# Patient Record
Sex: FEMALE | Race: BLACK OR AFRICAN AMERICAN | Employment: OTHER | URBAN - METROPOLITAN AREA
[De-identification: names, ages, dates, MRNs, and addresses within clinical notes are randomized per-mention and may not be internally consistent; named-entity substitution may affect disease eponyms.]

---

## 2017-02-24 ENCOUNTER — IP HISTORICAL/CONVERTED ENCOUNTER (OUTPATIENT)
Dept: OTHER | Age: 27
End: 2017-02-24

## 2020-10-11 ENCOUNTER — APPOINTMENT (OUTPATIENT)
Dept: GENERAL RADIOLOGY | Age: 30
End: 2020-10-11
Attending: EMERGENCY MEDICINE
Payer: MEDICARE

## 2020-10-11 ENCOUNTER — HOSPITAL ENCOUNTER (EMERGENCY)
Age: 30
Discharge: HOME | End: 2020-10-11
Attending: EMERGENCY MEDICINE
Payer: MEDICARE

## 2020-10-11 VITALS
HEIGHT: 67 IN | TEMPERATURE: 98.4 F | RESPIRATION RATE: 16 BRPM | SYSTOLIC BLOOD PRESSURE: 139 MMHG | BODY MASS INDEX: 25.11 KG/M2 | DIASTOLIC BLOOD PRESSURE: 102 MMHG | WEIGHT: 160 LBS | OXYGEN SATURATION: 100 % | HEART RATE: 87 BPM

## 2020-10-11 DIAGNOSIS — M25.571 RIGHT ANKLE PAIN, UNSPECIFIED CHRONICITY: Primary | ICD-10-CM

## 2020-10-11 PROCEDURE — 73600 X-RAY EXAM OF ANKLE: CPT

## 2020-10-11 PROCEDURE — 99283 EMERGENCY DEPT VISIT LOW MDM: CPT

## 2020-10-11 RX ORDER — IBUPROFEN 800 MG/1
800 TABLET ORAL AS NEEDED
COMMUNITY
Start: 2020-08-22 | End: 2021-07-26

## 2020-10-11 RX ORDER — AMOXICILLIN 500 MG/1
500 CAPSULE ORAL EVERY 8 HOURS
COMMUNITY
Start: 2020-08-22 | End: 2021-06-08 | Stop reason: SDUPTHER

## 2020-10-11 NOTE — LETTER
[address] 
[facility] EMERGENCY DEPT 
[address] 
[phone] Work/School Note Date: 10/11/2020 To Whom It May concern: 
 
Watson Will was seen and treated today in the emergency room by the following provider(s): 
Attending Provider: Lorin De Anda MD. Watson Will is excused from work/school on 10/11/20 and 10/12/20. She is medically clear to return to work/school on 10/13/2020.   
 
 
Sincerely, 
 
 
 
 
Nidia Chow MD

## 2020-10-11 NOTE — ED PROVIDER NOTES
HPI 71-year-old female injured her right ankle in November 2019 x-ray at that time was reportedly negative per her history. injury improved over the course of approximately 6 weeks. Since that time she said lateral ankle pain with extended walking. Job requires standing and walking for most of the day    Past Medical History:   Diagnosis Date    Asthma        History reviewed. No pertinent surgical history. History reviewed. No pertinent family history.     Social History     Socioeconomic History    Marital status: SINGLE     Spouse name: Not on file    Number of children: Not on file    Years of education: Not on file    Highest education level: Not on file   Occupational History    Not on file   Social Needs    Financial resource strain: Not on file    Food insecurity     Worry: Not on file     Inability: Not on file    Transportation needs     Medical: Not on file     Non-medical: Not on file   Tobacco Use    Smoking status: Current Every Day Smoker     Packs/day: 0.25    Smokeless tobacco: Current User   Substance and Sexual Activity    Alcohol use: Yes     Comment: ocassionally    Drug use: Not on file    Sexual activity: Not on file   Lifestyle    Physical activity     Days per week: Not on file     Minutes per session: Not on file    Stress: Not on file   Relationships    Social connections     Talks on phone: Not on file     Gets together: Not on file     Attends Buddhism service: Not on file     Active member of club or organization: Not on file     Attends meetings of clubs or organizations: Not on file     Relationship status: Not on file    Intimate partner violence     Fear of current or ex partner: Not on file     Emotionally abused: Not on file     Physically abused: Not on file     Forced sexual activity: Not on file   Other Topics Concern    Not on file   Social History Narrative    Not on file         ALLERGIES: Peanut    Review of Systems all other systems are negative    Vitals:    10/11/20 1028   BP: (!) 139/102   Pulse: 99   Resp: 16   Temp: 98.4 °F (36.9 °C)   SpO2: 99%   Weight: 72.6 kg (160 lb)   Height: 5' 7\" (1.702 m)            Physical Exam  Vitals signs and nursing note reviewed. Constitutional:       Appearance: Normal appearance. She is normal weight. Musculoskeletal:      Comments: The right ankle shows no gross deformity there is minimal swelling if any; minimal tenderness  Ankle there is no tenderness the fifth metatarsal foot is otherwise normal exam dorsalis pedis posterior tibial pulses are 2+ and capillary refill is normal   Skin:     General: Skin is warm and dry. Neurological:      General: No focal deficit present. Mental Status: She is alert and oriented to person, place, and time. MDM persistent right lateral ankle pain following injury approximately 1 year ago x-ray shows no acute finding suggested ankle support over-the-counter.   And athletic footwear will give Pritesh Luna follow-up       Procedures

## 2020-10-11 NOTE — ED TRIAGE NOTES
Pt states she sprain her right ankle a month ago and is still experiencing ain. Yesterday she rated pain at 10/10 but denies pain today. Pt has been taking ibuprofen to relive pain. Pt did not take any this morning. Pt states the ankle pain has affected her ability to work.

## 2021-06-08 ENCOUNTER — HOSPITAL ENCOUNTER (EMERGENCY)
Age: 31
Discharge: HOME | End: 2021-06-08
Attending: EMERGENCY MEDICINE
Payer: MEDICARE

## 2021-06-08 VITALS
WEIGHT: 180 LBS | SYSTOLIC BLOOD PRESSURE: 155 MMHG | OXYGEN SATURATION: 100 % | BODY MASS INDEX: 28.19 KG/M2 | HEART RATE: 91 BPM | TEMPERATURE: 98.2 F | RESPIRATION RATE: 20 BRPM | DIASTOLIC BLOOD PRESSURE: 102 MMHG

## 2021-06-08 DIAGNOSIS — J01.20 ACUTE ETHMOIDAL SINUSITIS, RECURRENCE NOT SPECIFIED: Primary | ICD-10-CM

## 2021-06-08 PROCEDURE — 74011250637 HC RX REV CODE- 250/637: Performed by: EMERGENCY MEDICINE

## 2021-06-08 PROCEDURE — 99283 EMERGENCY DEPT VISIT LOW MDM: CPT

## 2021-06-08 RX ORDER — AMOXICILLIN 500 MG/1
500 CAPSULE ORAL EVERY 8 HOURS
Qty: 30 CAPSULE | Refills: 0 | Status: SHIPPED | OUTPATIENT
Start: 2021-06-08 | End: 2021-07-26

## 2021-06-08 RX ORDER — CETIRIZINE HYDROCHLORIDE 10 MG/1
10 CAPSULE, LIQUID FILLED ORAL DAILY
Qty: 10 CAPSULE | Refills: 0 | Status: SHIPPED | OUTPATIENT
Start: 2021-06-08 | End: 2021-07-26

## 2021-06-08 RX ORDER — FLUTICASONE PROPIONATE 50 MCG
2 SPRAY, SUSPENSION (ML) NASAL DAILY
Qty: 1 BOTTLE | Refills: 0 | Status: SHIPPED | OUTPATIENT
Start: 2021-06-08 | End: 2021-07-26

## 2021-06-08 RX ORDER — AMOXICILLIN 250 MG/1
500 CAPSULE ORAL
Status: COMPLETED | OUTPATIENT
Start: 2021-06-08 | End: 2021-06-08

## 2021-06-08 RX ORDER — AMOXICILLIN 500 MG/1
500 TABLET, FILM COATED ORAL 3 TIMES DAILY
Qty: 30 TABLET | Refills: 0 | Status: SHIPPED | OUTPATIENT
Start: 2021-06-08 | End: 2021-07-26

## 2021-06-08 RX ADMIN — AMOXICILLIN 500 MG: 250 CAPSULE ORAL at 21:20

## 2021-06-08 NOTE — LETTER
[address] [facility] EMERGENCY DEPT 
[address] 
[phone] Work/School Note Date: 6/8/2021 To Whom It May concern: 
 
 
Andrew Jonas was seen and treated today in the emergency room by the following provider(s): 
Attending Provider: Tracey Guadarrama MD. Andrew Jonas is excused from work/school on 06/08/21. She is clear to return to work/school on 06/09/21. Sincerely, Marlen Beckett RN

## 2021-06-09 NOTE — ED PROVIDER NOTES
Patient presents with complaint of cough, nasal congestion and yellow nasal discharge for past  2 days. Duration:  2 days     Intensity: moderate    Modified by: breathing. Past Medical History:   Diagnosis Date    Asthma        History reviewed. No pertinent surgical history. History reviewed. No pertinent family history. Social History     Socioeconomic History    Marital status: SINGLE     Spouse name: Not on file    Number of children: Not on file    Years of education: Not on file    Highest education level: Not on file   Occupational History    Not on file   Tobacco Use    Smoking status: Current Every Day Smoker     Packs/day: 0.25    Smokeless tobacco: Current User   Vaping Use    Vaping Use: Never used   Substance and Sexual Activity    Alcohol use: Yes     Comment: ocassionally    Drug use: Not Currently    Sexual activity: Yes   Other Topics Concern    Not on file   Social History Narrative    Not on file     Social Determinants of Health     Financial Resource Strain:     Difficulty of Paying Living Expenses:    Food Insecurity:     Worried About Running Out of Food in the Last Year:     [address] in the Last Year:    Transportation Needs:     Lack of Transportation (Medical):  Lack of Transportation (Non-Medical):    Physical Activity:     Days of Exercise per Week:     Minutes of Exercise per Session:    Stress:     Feeling of Stress :    Social Connections:     Frequency of Communication with Friends and Family:     Frequency of Social Gatherings with Friends and Family:     Attends Restorationism Services:     Active Member of Clubs or Organizations:     Attends Club or Organization Meetings:     Marital Status:    Intimate Partner Violence:     Fear of Current or Ex-Partner:     Emotionally Abused:     Physically Abused:     Sexually Abused: ALLERGIES: Peanut    Review of Systems   Constitutional: Negative.     HENT: Positive for congestion, sinus pressure, sinus pain and sore throat. Eyes: Negative. Respiratory: Positive for cough. Cardiovascular: Negative. Gastrointestinal: Negative. Endocrine: Negative. Genitourinary: Negative. Skin: Negative. Allergic/Immunologic: Negative. Neurological: Negative. Hematological: Negative. Psychiatric/Behavioral: Negative. All other systems reviewed and are negative. Vitals:    06/08/21 2037   BP: (!) 155/102   Pulse: 91   Resp: 20   Temp: 98.2 °F (36.8 °C)   SpO2: 100%   Weight: 81.6 kg (180 lb)            Physical Exam  Vitals and nursing note reviewed. Constitutional:       Appearance: She is well-developed. HENT:      Head: Normocephalic and atraumatic. Nose: Congestion and rhinorrhea present. Cardiovascular:      Rate and Rhythm: Normal rate and regular rhythm. Heart sounds: Normal heart sounds. Pulmonary:      Breath sounds: Normal breath sounds. Abdominal:      General: Bowel sounds are normal.      Palpations: Abdomen is soft. Musculoskeletal:         General: Normal range of motion. Cervical back: Normal range of motion and neck supple. Neurological:      General: No focal deficit present. Mental Status: She is alert.    Psychiatric:         Mood and Affect: Mood normal.         Behavior: Behavior normal.          MDM       Procedures

## 2021-07-26 ENCOUNTER — HOSPITAL ENCOUNTER (EMERGENCY)
Age: 31
Discharge: HOME | End: 2021-07-26
Attending: EMERGENCY MEDICINE
Payer: MEDICARE

## 2021-07-26 VITALS
TEMPERATURE: 98.6 F | RESPIRATION RATE: 18 BRPM | OXYGEN SATURATION: 98 % | HEART RATE: 86 BPM | SYSTOLIC BLOOD PRESSURE: 131 MMHG | DIASTOLIC BLOOD PRESSURE: 103 MMHG

## 2021-07-26 DIAGNOSIS — N94.6 MENSTRUAL CRAMPS: ICD-10-CM

## 2021-07-26 DIAGNOSIS — N39.0 URINARY TRACT INFECTION WITHOUT HEMATURIA, SITE UNSPECIFIED: Primary | ICD-10-CM

## 2021-07-26 LAB
APPEARANCE UR: CLEAR
BACTERIA URNS QL MICRO: ABNORMAL /HPF
BILIRUB UR QL: NEGATIVE
COLOR UR: ABNORMAL
GLUCOSE UR STRIP.AUTO-MCNC: NEGATIVE MG/DL
HCG UR QL: NEGATIVE
HGB UR QL STRIP: ABNORMAL
KETONES UR QL STRIP.AUTO: ABNORMAL MG/DL
LEUKOCYTE ESTERASE UR QL STRIP.AUTO: NEGATIVE
NITRITE UR QL STRIP.AUTO: NEGATIVE
PH UR STRIP: 6 [PH] (ref 5–8)
PROT UR STRIP-MCNC: NEGATIVE MG/DL
RBC #/AREA URNS HPF: ABNORMAL /HPF (ref 0–3)
SP GR UR REFRACTOMETRY: 1.02
SP GR UR REFRACTOMETRY: 1.02 (ref 1–1.03)
UROBILINOGEN UR QL STRIP.AUTO: 1 EU/DL (ref 0.2–1)
WBC URNS QL MICRO: ABNORMAL /HPF (ref 0–5)

## 2021-07-26 PROCEDURE — 74011250637 HC RX REV CODE- 250/637: Performed by: EMERGENCY MEDICINE

## 2021-07-26 PROCEDURE — 81025 URINE PREGNANCY TEST: CPT

## 2021-07-26 PROCEDURE — 99283 EMERGENCY DEPT VISIT LOW MDM: CPT

## 2021-07-26 PROCEDURE — 81001 URINALYSIS AUTO W/SCOPE: CPT

## 2021-07-26 RX ORDER — SULFAMETHOXAZOLE AND TRIMETHOPRIM 800; 160 MG/1; MG/1
1 TABLET ORAL 2 TIMES DAILY
Qty: 6 TABLET | Refills: 0 | Status: SHIPPED | OUTPATIENT
Start: 2021-07-26 | End: 2021-07-29

## 2021-07-26 RX ORDER — IBUPROFEN 800 MG/1
800 TABLET ORAL ONCE
Status: COMPLETED | OUTPATIENT
Start: 2021-07-26 | End: 2021-07-26

## 2021-07-26 RX ORDER — PENICILLIN V POTASSIUM 500 MG/1
TABLET, FILM COATED ORAL
COMMUNITY
Start: 2021-05-18 | End: 2022-05-28

## 2021-07-26 RX ADMIN — IBUPROFEN 800 MG: 800 TABLET, FILM COATED ORAL at 13:05

## 2021-07-26 NOTE — ED PROVIDER NOTES
EMERGENCY DEPARTMENT HISTORY AND PHYSICAL EXAM      Date: 7/26/2021  Patient Name: Piedad Villavicencio    History of Presenting Illness     Chief Complaint   Patient presents with    Premenstrual Syndrome       History Provided By: Patient    HPI: Piedad Villavicencio, 28 y.o. female with a past medical history significant asthma and Dental problems presents to the ED with cc of menstrual cramping. Menses began yesterday with cramping that seems more painful than usual.  Patient normally has menstrual cramps with onset of menses has not taken any medication for this. Possibility of pregnancy considered though patient doubts. She is unsure of her last menstrual period though thinks it was about 4 weeks ago. No urinary symptoms fever nausea vomiting diarrhea. No prior abdominal surgery. No other complaints. Denies new sexual contact. There are no other complaints, changes, or physical findings at this time. PCP: Steven Lerma MD    No current facility-administered medications on file prior to encounter. Current Outpatient Medications on File Prior to Encounter   Medication Sig Dispense Refill    penicillin v potassium (VEETID) 500 mg tablet TAKE 1 TABLET BY MOUTH TWICE DAILY      [DISCONTINUED] fluticasone propionate (FLONASE) 50 mcg/actuation nasal spray 2 Sprays by Both Nostrils route daily. 1 Bottle 0    [DISCONTINUED] Cetirizine (ZyrTEC) 10 mg cap Take 10 mg by mouth daily. 10 Capsule 0    [DISCONTINUED] amoxicillin 500 mg tab Take 500 mg by mouth three (3) times daily. 30 Tablet 0    [DISCONTINUED] amoxicillin (AMOXIL) 500 mg capsule Take 1 Capsule by mouth every eight (8) hours. 30 Capsule 0    [DISCONTINUED] ibuprofen (MOTRIN) 800 mg tablet Take 800 mg by mouth as needed. (Patient not taking: Reported on 6/8/2021)         Past History     Past Medical History:  Past Medical History:   Diagnosis Date    Asthma        Past Surgical History:  History reviewed.  No pertinent surgical history. Family History:  History reviewed. No pertinent family history. Social History:  Social History     Tobacco Use    Smoking status: Current Every Day Smoker     Packs/day: 0.25    Smokeless tobacco: Current User   Vaping Use    Vaping Use: Never used   Substance Use Topics    Alcohol use: Yes     Comment: ocassionally    Drug use: Not Currently       Allergies: Allergies   Allergen Reactions    Peanut Hives and Swelling         Review of Systems     Review of all systems negative  Review of Systems    Physical Exam   Young AA female no acute distress  Physical Exam  Vitals and nursing note reviewed. Constitutional:       General: She is not in acute distress. Appearance: Normal appearance. She is not ill-appearing, toxic-appearing or diaphoretic. HENT:      Head: Normocephalic and atraumatic. Nose: Nose normal.      Mouth/Throat:      Mouth: Mucous membranes are moist.   Eyes:      Extraocular Movements: Extraocular movements intact. Conjunctiva/sclera: Conjunctivae normal.   Cardiovascular:      Rate and Rhythm: Normal rate and regular rhythm. Pulses: Normal pulses. Heart sounds: Normal heart sounds. No murmur heard. Pulmonary:      Effort: Pulmonary effort is normal. No respiratory distress. Breath sounds: Normal breath sounds. No wheezing, rhonchi or rales. Chest:      Chest wall: No tenderness. Abdominal:      General: Abdomen is flat. Palpations: There is no mass. Tenderness: There is no abdominal tenderness. There is no right CVA tenderness, left CVA tenderness, guarding or rebound. Hernia: No hernia is present. Musculoskeletal:         General: No tenderness, deformity or signs of injury. Normal range of motion. Cervical back: Normal range of motion and neck supple. No rigidity. No muscular tenderness. Right lower leg: No edema. Left lower leg: No edema. Skin:     General: Skin is warm.       Findings: No erythema or rash. Neurological:      General: No focal deficit present. Mental Status: She is alert and oriented to person, place, and time. Cranial Nerves: No cranial nerve deficit. Sensory: No sensory deficit. Motor: No weakness. Psychiatric:         Mood and Affect: Mood normal.         Behavior: Behavior normal.         Thought Content: Thought content normal.         Lab and Diagnostic Study Results     Labs -   No results found for this or any previous visit (from the past 12 hour(s)). Radiologic Studies -   @lastxrresult@  CT Results  (Last 48 hours)    None        CXR Results  (Last 48 hours)    None            Medical Decision Making   - I am the first provider for this patient. - I reviewed the vital signs, available nursing notes, past medical history, past surgical history, family history and social history. - Initial assessment performed. The patients presenting problems have been discussed, and they are in agreement with the care plan formulated and outlined with them. I have encouraged them to ask questions as they arise throughout their visit. Vital Signs-Reviewed the patient's vital signs. Patient Vitals for the past 12 hrs:   Temp Pulse Resp BP SpO2   07/26/21 1254 -- -- -- -- 98 %   07/26/21 1236 98.6 °F (37 °C) 86 18 (!) 131/103 100 %       Records Reviewed: Nursing Notes and Old Medical Records    The patient presents with pelvic cramping with a differential diagnosis of menstrual cramps; PID; pregnancy; threatened AB ovarian cyst;; appendicitis; renal colic      ED Course: 7874: Labs returned showing UTI went back to discuss this with the patient and she is left the ED for unknown reason-called cell phone and left a message. 1445: Second call to patient teleconference had to leave urgently to  Her Son. Discussed UTI menstrual cramps. No drug allergies. Bactrim prescription sent to [facility].   She plans to follow-up with her regular physician tomorrow. Instructed return for any new or worse symptoms         Provider Notes (Medical Decision Making): MDM       Procedures   Medical Decision Makingedical Decision Making  Performed by: Shahida Barker MD  PROCEDURES:  Procedures       Disposition   Disposition: Condition unchanged and stable  DC- Adult Discharges: All of the diagnostic tests were reviewed and questions answered. Diagnosis, care plan and treatment options were discussed. The patient understands the instructions and will follow up as directed. The patients results have been reviewed with them. They have been counseled regarding their diagnosis. The patient verbally convey understanding and agreement of the signs, symptoms, diagnosis, treatment and prognosis and additionally agrees to follow up as recommended with their PCP in 24 - 48 hours. They also agree with the care-plan and convey that all of their questions have been answered. I have also put together some discharge instructions for them that include: 1) educational information regarding their diagnosis, 2) how to care for their diagnosis at home, as well a 3) list of reasons why they would want to return to the ED prior to their follow-up appointment, should their condition change. DISCHARGE PLAN:  1. Current Discharge Medication List      CONTINUE these medications which have NOT CHANGED    Details   penicillin v potassium (VEETID) 500 mg tablet TAKE 1 TABLET BY MOUTH TWICE DAILY           2. Follow-up Information    None       3. Return to ED if worse   4. Current Discharge Medication List            Diagnosis     Clinical Impression: Urinary tract infection; menstrual cramps  Attestations:    Shahida Barker MD    Please note that this dictation was completed with Retty, the computer voice recognition software. Quite often unanticipated grammatical, syntax, homophones, and other interpretive errors are inadvertently transcribed by the computer software.   Please disregard these errors. Please excuse any errors that have escaped final proofreading. Thank you.

## 2021-07-26 NOTE — ED NOTES
Pt made aware that ultrasounds are ordered via MD discretion. Pt originally placed in room 4 pt requesting different room \"due to wanting to be closer to front and stating \"this is the room for ambulances\".  Pt moved to Exam room 10 by request.

## 2021-07-26 NOTE — ED NOTES
Pt states that she has to leave and go  her kid and can not wait for results of test. Pt asking to have results called to her when MD is available

## 2021-07-26 NOTE — ED TRIAGE NOTES
Pt reports severe pelvic cramping that started yesterday when cycle started. PT reports OB appointment tomorrow. PT requesting ultrasound to [facility] sure nothings going on\".

## 2021-11-01 ENCOUNTER — HOSPITAL ENCOUNTER (EMERGENCY)
Age: 31
Discharge: HOME | End: 2021-11-01
Attending: EMERGENCY MEDICINE
Payer: MEDICARE

## 2021-11-01 VITALS
DIASTOLIC BLOOD PRESSURE: 92 MMHG | HEART RATE: 88 BPM | TEMPERATURE: 98.4 F | WEIGHT: 180 LBS | OXYGEN SATURATION: 100 % | RESPIRATION RATE: 18 BRPM | HEIGHT: 67 IN | SYSTOLIC BLOOD PRESSURE: 143 MMHG | BODY MASS INDEX: 28.25 KG/M2

## 2021-11-01 DIAGNOSIS — T78.40XA ALLERGY, INITIAL ENCOUNTER: Primary | ICD-10-CM

## 2021-11-01 PROCEDURE — 99281 EMR DPT VST MAYX REQ PHY/QHP: CPT

## 2021-11-01 RX ORDER — LORATADINE 10 MG/1
5 TABLET ORAL DAILY
Qty: 10 TABLET | Refills: 0 | Status: SHIPPED | OUTPATIENT
Start: 2021-11-01 | End: 2022-05-28

## 2021-11-01 NOTE — ED PROVIDER NOTES
EMERGENCY DEPARTMENT HISTORY AND PHYSICAL EXAM      Date: 11/1/2021  Patient Name: Juany Gasca    History of Presenting Illness     Chief Complaint   Patient presents with    Sore Throat    Cough       History Provided By: Patient    HPI: Juany Gasca, [address] [place] y.o. female with a past medical history significant asthma presents to the ED with cc of cough runny nose patient denies any sore throat today symptoms present for 5 days patient got relief using Claritin denies any fever chills no nausea vomiting diarrhea    There are no other complaints, changes, or physical findings at this time. PCP: Misbah Rodríguez MD    No current facility-administered medications on file prior to encounter. Current Outpatient Medications on File Prior to Encounter   Medication Sig Dispense Refill    penicillin v potassium (VEETID) 500 mg tablet TAKE 1 TABLET BY MOUTH TWICE DAILY         Past History     Past Medical History:  Past Medical History:   Diagnosis Date    Asthma        Past Surgical History:  No past surgical history on file. Family History:  No family history on file. Social History:  Social History     Tobacco Use    Smoking status: Current Every Day Smoker     Packs/day: 0.25    Smokeless tobacco: Current User   Vaping Use    Vaping Use: Never used   Substance Use Topics    Alcohol use: Yes     Comment: ocassionally    Drug use: Not Currently       Allergies: Allergies   Allergen Reactions    Peanut Hives and Swelling         Review of Systems     Review of Systems   Constitutional: Negative for chills and fever. HENT: Positive for congestion. Negative for rhinorrhea and sore throat. Eyes: Negative for pain and visual disturbance. Respiratory: Positive for cough. Negative for wheezing. Cardiovascular: Negative for chest pain and leg swelling. Gastrointestinal: Negative for abdominal pain and rectal pain. Endocrine: Negative for polydipsia and polyuria. Genitourinary: Negative for dysuria and urgency. Musculoskeletal: Negative for back pain. Neurological: Negative for weakness and headaches. Psychiatric/Behavioral: Negative. Physical Exam     Physical Exam  Vitals and nursing note reviewed. Constitutional:       General: She is not in acute distress. Appearance: She is well-developed and normal weight. She is not ill-appearing, toxic-appearing or diaphoretic. HENT:      Head: Normocephalic and atraumatic. Right Ear: Tympanic membrane normal.      Left Ear: Tympanic membrane normal.      Nose: Congestion present. No rhinorrhea. Mouth/Throat:      Mouth: Mucous membranes are moist.      Pharynx: Oropharynx is clear. Uvula midline. No pharyngeal swelling, oropharyngeal exudate or posterior oropharyngeal erythema. Eyes:      Conjunctiva/sclera: Conjunctivae normal.      Pupils: Pupils are equal, round, and reactive to light. Cardiovascular:      Rate and Rhythm: Normal rate and regular rhythm. Heart sounds: Normal heart sounds. Pulmonary:      Effort: Pulmonary effort is normal.      Breath sounds: Normal breath sounds. Abdominal:      General: Bowel sounds are normal.      Palpations: Abdomen is soft. Tenderness: There is no abdominal tenderness. Skin:     General: Skin is warm and dry. Capillary Refill: Capillary refill takes less than 2 seconds. Neurological:      General: No focal deficit present. Mental Status: She is alert and oriented to person, place, and time. Psychiatric:         Mood and Affect: Mood normal.         Behavior: Behavior normal.         Lab and Diagnostic Study Results     Labs -   No results found for this or any previous visit (from the past 12 hour(s)). Radiologic Studies -   @lastxrresult@  CT Results  (Last 48 hours)    None        CXR Results  (Last 48 hours)    None            Medical Decision Making   - I am the first provider for this patient.     - I reviewed the vital signs, available nursing notes, past medical history, past surgical history, family history and social history. - Initial assessment performed. The patients presenting problems have been discussed, and they are in agreement with the care plan formulated and outlined with them. I have encouraged them to ask questions as they arise throughout their visit. Vital Signs-Reviewed the patient's vital signs. Patient Vitals for the past 12 hrs:   Temp Pulse Resp BP SpO2   11/01/21 1859 98.4 °F (36.9 °C) 88 18 (!) 143/92 100 %       Records Reviewed: Nursing Notes    The patient presents with cough with a differential diagnosis of URI, pneumonia, asthma      ED Course:          Provider Notes (Medical Decision Making): MDM       Procedures   Medical Decision Makingedical Decision Making  Performed by: Krystle Terrazas MD  PROCEDURES:  Procedures       Disposition   Disposition: Condition stable  DC- Adult Discharges: All of the diagnostic tests were reviewed and questions answered. Diagnosis, care plan and treatment options were discussed. The patient understands the instructions and will follow up as directed. The patients results have been reviewed with them. They have been counseled regarding their diagnosis. The patient verbally convey understanding and agreement of the signs, symptoms, diagnosis, treatment and prognosis and additionally agrees to follow up as recommended with their PCP in 24 - 48 hours. They also agree with the care-plan and convey that all of their questions have been answered. I have also put together some discharge instructions for them that include: 1) educational information regarding their diagnosis, 2) how to care for their diagnosis at home, as well a 3) list of reasons why they would want to return to the ED prior to their follow-up appointment, should their condition change. Discharged    DISCHARGE PLAN:  1.    Current Discharge Medication List      START taking these medications    Details   loratadine (Claritin) 10 mg tablet Take 0.5 Tablets by mouth daily. Qty: 10 Tablet, Refills: 0         CONTINUE these medications which have NOT CHANGED    Details   penicillin v potassium (VEETID) 500 mg tablet TAKE 1 TABLET BY MOUTH TWICE DAILY           2. Follow-up Information     Follow up With Specialties Details Why Contact Info    Tim Morgan MD Internal Medicine   [id number]  [phone]          3. Return to ED if worse   4. Current Discharge Medication List      START taking these medications    Details   loratadine (Claritin) 10 mg tablet Take 0.5 Tablets by mouth daily. Qty: 10 Tablet, Refills: 0  Start date: 11/1/2021               Diagnosis     Clinical Impression:   1. Allergy, initial encounter        Attestations:    Deven Babcock MD    Please note that this dictation was completed with Expreem, the computer voice recognition software. Quite often unanticipated grammatical, syntax, homophones, and other interpretive errors are inadvertently transcribed by the computer software. Please disregard these errors. Please excuse any errors that have escaped final proofreading. Thank you.

## 2021-11-01 NOTE — LETTER
[address]  [facility] EMERGENCY DEPT  [address]  [place] [facility] [address]  [phone]    Work/School Note    Date: 11/1/2021    To Whom It May concern:      Roby Raza was seen and treated today in the emergency room by the following provider(s):  Attending Provider: Soy Estrada MD.      Roby Raza is excused from work/school on 11/01/21. She is clear to return to work/school on 11/02/21.         Sincerely,          Anju Mcmullen MD

## 2022-05-28 ENCOUNTER — HOSPITAL ENCOUNTER (EMERGENCY)
Age: 32
Discharge: HOME | End: 2022-05-28
Attending: EMERGENCY MEDICINE
Payer: MEDICARE

## 2022-05-28 VITALS
DIASTOLIC BLOOD PRESSURE: 103 MMHG | TEMPERATURE: 98.1 F | HEIGHT: 67 IN | RESPIRATION RATE: 16 BRPM | WEIGHT: 180 LBS | BODY MASS INDEX: 28.25 KG/M2 | HEART RATE: 82 BPM | SYSTOLIC BLOOD PRESSURE: 155 MMHG | OXYGEN SATURATION: 99 %

## 2022-05-28 DIAGNOSIS — F39 MOOD DISORDER (HCC): Primary | ICD-10-CM

## 2022-05-28 PROCEDURE — 99282 EMERGENCY DEPT VISIT SF MDM: CPT

## 2022-05-28 RX ORDER — SERTRALINE HYDROCHLORIDE 50 MG/1
50 TABLET, FILM COATED ORAL DAILY
COMMUNITY

## 2022-05-28 NOTE — ED TRIAGE NOTES
Patient reports that 3 months ago she broke up with her boyfriend & shortly after began having RLQ abdominal pain with nausea but then the pain just shot down her right leg. Reports that she took a HPT 3 months ago which was negative & then got her period. Patient reports hx of depression & states that she just feels delirious most of the time. Patient is currently taking sertraline 50mg daily. Denies SI&HI.

## 2022-05-28 NOTE — ED PROVIDER NOTES
EMERGENCY DEPARTMENT HISTORY AND PHYSICAL EXAM      Date: 5/28/2022  Patient Name: Emir Christopher    History of Presenting Illness     Chief Complaint   Patient presents with    Headache    Nausea    Depression       History Provided By: Patient    HPI: Emir Christopher, 33 y.o. female with a past medical history significant Bipolar disorder presents to the ED with cc of delirium which she describes as having an out of body sensation, patient states that she stopped taking there sertraline because it put her body in an imbalance which she stated specifically as nausea, patient denies any homicidal suicidal ideations, patient was on sertraline for 1 month which she finished 3 months ago all symptoms mentioned in triage have been ongoing for the past 3 months to which she has not seen her PCP nor contacted local behavioral health resources, patient states she was diagnosed with bipolar disorder 2016 and has been on several medications including Latuda and Seroquel, has history of postpartum depression, patient states there was a refill for the sertraline which she did not obtain    There are no other complaints, changes, or physical findings at this time. PCP: Dottie Pearson MD    No current facility-administered medications on file prior to encounter. Current Outpatient Medications on File Prior to Encounter   Medication Sig Dispense Refill    sertraline (ZOLOFT) 50 mg tablet Take 50 mg by mouth daily.  [DISCONTINUED] loratadine (Claritin) 10 mg tablet Take 0.5 Tablets by mouth daily. 10 Tablet 0    [DISCONTINUED] penicillin v potassium (VEETID) 500 mg tablet TAKE 1 TABLET BY MOUTH TWICE DAILY         Past History     Past Medical History:  Past Medical History:   Diagnosis Date    Asthma        Past Surgical History:  No past surgical history on file. Family History:  No family history on file.     Social History:  Social History     Tobacco Use    Smoking status: Current Every Day Smoker     Packs/day: 0.25    Smokeless tobacco: Current User   Vaping Use    Vaping Use: Never used   Substance Use Topics    Alcohol use: Yes     Comment: ocassionally    Drug use: Not Currently       Allergies: Allergies   Allergen Reactions    Peanut Hives and Swelling         Review of Systems     Review of Systems   Constitutional: Negative for chills and fever. HENT: Negative for rhinorrhea and sore throat. Eyes: Negative for pain and visual disturbance. Respiratory: Negative for cough and shortness of breath. Cardiovascular: Negative for chest pain and leg swelling. Gastrointestinal: Negative for abdominal pain and vomiting. Endocrine: Negative for polydipsia and polyuria. Genitourinary: Negative for dysuria and hematuria. Musculoskeletal: Negative for back pain and neck pain. Skin: Negative for color change and pallor. Neurological: Negative for weakness and headaches. Psychiatric/Behavioral: Positive for sleep disturbance. Negative for agitation, behavioral problems, confusion, decreased concentration, dysphoric mood, hallucinations, self-injury and suicidal ideas. The patient is not nervous/anxious and is not hyperactive. Physical Exam     Physical Exam  Vitals and nursing note reviewed. Constitutional:       General: She is not in acute distress. Appearance: She is not ill-appearing, toxic-appearing or diaphoretic. HENT:      Head: Normocephalic and atraumatic. Right Ear: Tympanic membrane normal.      Left Ear: Tympanic membrane normal.      Nose: Nose normal. No congestion. Mouth/Throat:      Mouth: Mucous membranes are moist.      Pharynx: Oropharynx is clear. Eyes:      Extraocular Movements: Extraocular movements intact. Conjunctiva/sclera: Conjunctivae normal.      Pupils: Pupils are equal, round, and reactive to light. Cardiovascular:      Rate and Rhythm: Normal rate and regular rhythm. Pulses: Normal pulses.       Heart sounds: Normal heart sounds. Pulmonary:      Effort: Pulmonary effort is normal.      Breath sounds: Normal breath sounds. Abdominal:      General: Bowel sounds are normal.      Palpations: Abdomen is soft. Tenderness: There is no abdominal tenderness. Musculoskeletal:         General: No tenderness, deformity or signs of injury. Normal range of motion. Cervical back: Normal range of motion and neck supple. No rigidity or tenderness. Lymphadenopathy:      Cervical: No cervical adenopathy. Skin:     General: Skin is warm and dry. Capillary Refill: Capillary refill takes less than 2 seconds. Findings: No rash. Neurological:      General: No focal deficit present. Mental Status: She is alert and oriented to person, place, and time. Cranial Nerves: No cranial nerve deficit. Sensory: No sensory deficit. Psychiatric:         Attention and Perception: Attention and perception normal. She does not perceive auditory or visual hallucinations. Mood and Affect: Mood normal. Mood is not anxious or depressed. Affect is not blunt, flat, angry, tearful or inappropriate. Speech: Speech normal. Speech is not rapid and pressured, delayed or slurred. Behavior: Behavior normal. Behavior is not agitated, slowed, aggressive, withdrawn or combative. Behavior is cooperative. Thought Content: Thought content normal. Thought content is not paranoid. Thought content does not include homicidal or suicidal ideation. Cognition and Memory: Cognition and memory normal.         Judgment: Judgment normal.         Lab and Diagnostic Study Results     Labs -   No results found for this or any previous visit (from the past 12 hour(s)). Radiologic Studies -   @lastxrresult@  CT Results  (Last 48 hours)    None        CXR Results  (Last 48 hours)    None            Medical Decision Making   - I am the first provider for this patient.     - I reviewed the vital signs, available nursing notes, past medical history, past surgical history, family history and social history. - Initial assessment performed. The patients presenting problems have been discussed, and they are in agreement with the care plan formulated and outlined with them. I have encouraged them to ask questions as they arise throughout their visit. Vital Signs-Reviewed the patient's vital signs. Patient Vitals for the past 12 hrs:   Temp Pulse Resp BP SpO2   05/28/22 1314 98.1 °F (36.7 °C) 82 16 (!) 155/103 99 %       Records Reviewed: Nursing Notes    The patient presents with seeking psychiatric counseling with a differential diagnosis of suicidal, homicidal, major depression      ED Course:          Provider Notes (Medical Decision Making): MDM       Procedures   Medical Decision Makingedical Decision Making  Performed by: Kenrick Quevedo MD  PROCEDURES:  Procedures       Disposition   Disposition: Condition stable and ongoing  DC- Adult Discharges: All of the diagnostic tests were reviewed and questions answered. Diagnosis, care plan and treatment options were discussed. The patient understands the instructions and will follow up as directed. The patients results have been reviewed with them. They have been counseled regarding their diagnosis. The patient verbally convey understanding and agreement of the signs, symptoms, diagnosis, treatment and prognosis and additionally agrees to follow up as recommended with their PCP in 24 - 48 hours. They also agree with the care-plan and convey that all of their questions have been answered. I have also put together some discharge instructions for them that include: 1) educational information regarding their diagnosis, 2) how to care for their diagnosis at home, as well a 3) list of reasons why they would want to return to the ED prior to their follow-up appointment, should their condition change. DISCHARGE PLAN:  1.    Current Discharge Medication List      CONTINUE these medications which have NOT CHANGED    Details   sertraline (ZOLOFT) 50 mg tablet Take 50 mg by mouth daily. 2.   Follow-up Information    None       3. Return to ED if worse   4. Current Discharge Medication List            Diagnosis     Clinical Impression: No diagnosis found. Attestations:    Dejon Tobias MD    Please note that this dictation was completed with [employer], the computer voice recognition software. Quite often unanticipated grammatical, syntax, homophones, and other interpretive errors are inadvertently transcribed by the computer software. Please disregard these errors. Please excuse any errors that have escaped final proofreading. Thank you.

## 2022-05-28 NOTE — DISCHARGE INSTRUCTIONS
Follow-up with your primary care provider as to whether to start up the sertraline after 3 months of not taking it and follow-up with Im[facility] for psychiatric counseling

## 2022-10-01 ENCOUNTER — HOSPITAL ENCOUNTER (EMERGENCY)
Age: 32
Discharge: HOME | End: 2022-10-01
Attending: EMERGENCY MEDICINE
Payer: MEDICARE

## 2022-10-01 VITALS
OXYGEN SATURATION: 100 % | SYSTOLIC BLOOD PRESSURE: 147 MMHG | WEIGHT: 180 LBS | TEMPERATURE: 98.5 F | DIASTOLIC BLOOD PRESSURE: 103 MMHG | RESPIRATION RATE: 16 BRPM | BODY MASS INDEX: 28.93 KG/M2 | HEART RATE: 80 BPM | HEIGHT: 66 IN

## 2022-10-01 DIAGNOSIS — N94.6 MENSTRUAL CRAMPS: Primary | ICD-10-CM

## 2022-10-01 PROCEDURE — 99283 EMERGENCY DEPT VISIT LOW MDM: CPT

## 2022-10-01 NOTE — ED NOTES
In to obtain lab work from patient and retreive urine patient reports that she forgot to urinate in specimen cup while in the bathroom.  Patient also reports that she is not ready to be stuck with a  needle at this time

## 2022-10-01 NOTE — ED PROVIDER NOTES
HPI 69-year-old female with asthma and bipolar disorder presents ED complaining of crampy sudden onset of menstrual bleeding this morning. Question of hematuria. Possibility of pregnancy. LMP was approximately 4 weeks ago and normal.  No fever or abdominal pain. No vaginal discharge denies other symptoms    Past Medical History:   Diagnosis Date    Asthma        No past surgical history on file. History reviewed. No pertinent family history. Social History     Socioeconomic History    Marital status: SINGLE     Spouse name: Not on file    Number of children: Not on file    Years of education: Not on file    Highest education level: Not on file   Occupational History    Not on file   Tobacco Use    Smoking status: Every Day     Packs/day: 0.25     Types: Cigarettes    Smokeless tobacco: Current   Vaping Use    Vaping Use: Never used   Substance and Sexual Activity    Alcohol use: Yes     Comment: ocassionally    Drug use: Not Currently    Sexual activity: Yes   Other Topics Concern    Not on file   Social History Narrative    Not on file     Social Determinants of Health     Financial Resource Strain: Not on file   Food Insecurity: Not on file   Transportation Needs: Not on file   Physical Activity: Not on file   Stress: Not on file   Social Connections: Not on file   Intimate Partner Violence: Not on file   Housing Stability: Not on file         ALLERGIES: Peanut    Review of Systems   Constitutional: Negative. HENT: Negative. Eyes: Negative. Respiratory:  Negative for cough, chest tightness, shortness of breath and wheezing. Cardiovascular:  Negative for chest pain, palpitations and leg swelling. Gastrointestinal:  Negative for abdominal distention, abdominal pain, blood in stool, constipation, diarrhea, nausea and vomiting. Endocrine: Negative. Genitourinary:  Positive for hematuria, menstrual problem and vaginal bleeding.  Negative for difficulty urinating, dysuria, flank pain, frequency, pelvic pain, urgency, vaginal discharge and vaginal pain. Musculoskeletal: Negative. Neurological:  Negative for dizziness, seizures, speech difficulty, weakness and numbness. Hematological: Negative. Psychiatric/Behavioral: Negative. Vitals:    10/01/22 1720   BP: (!) 147/103   Pulse: 80   Resp: 16   Temp: 98.5 °F (36.9 °C)   SpO2: 100%   Weight: 81.6 kg (180 lb)   Height: 5' 6\" (1.676 m)            Physical Exam  Vitals and nursing note reviewed. Constitutional:       General: She is not in acute distress. Appearance: Normal appearance. She is not ill-appearing, toxic-appearing or diaphoretic. HENT:      Head: Normocephalic and atraumatic. Nose: Nose normal.      Mouth/Throat:      Mouth: Mucous membranes are moist.   Eyes:      Extraocular Movements: Extraocular movements intact. Conjunctiva/sclera: Conjunctivae normal.   Cardiovascular:      Rate and Rhythm: Normal rate and regular rhythm. Pulses: Normal pulses. Heart sounds: Normal heart sounds. No murmur heard. Pulmonary:      Effort: Pulmonary effort is normal. No respiratory distress. Breath sounds: Normal breath sounds. No wheezing, rhonchi or rales. Abdominal:      General: Bowel sounds are normal. There is no distension. Palpations: Abdomen is soft. There is no mass. Tenderness: There is no abdominal tenderness. There is no right CVA tenderness, left CVA tenderness, guarding or rebound. Hernia: No hernia is present. Musculoskeletal:         General: No swelling, tenderness, deformity or signs of injury. Normal range of motion. Cervical back: Normal range of motion and neck supple. No rigidity or tenderness. Right lower leg: No edema. Left lower leg: No edema. Lymphadenopathy:      Cervical: No cervical adenopathy. Skin:     General: Skin is warm and dry. Capillary Refill: Capillary refill takes less than 2 seconds.       Findings: No erythema, lesion or rash.   Neurological:      General: No focal deficit present. Mental Status: She is alert and oriented to person, place, and time. Mental status is at baseline. Cranial Nerves: No cranial nerve deficit. Sensory: No sensory deficit. Psychiatric:         Mood and Affect: Mood normal.         Behavior: Behavior normal.         Thought Content: Thought content normal.        MDM  Patient refuses phlebotomy. Nurse instructed patient to obtain urine sample-return from bathroom\" I forgot to repeat in the cup\". 1828: Patient now insistent on discharge and wishes to follow-up with her PCP clinically doubt serious underlying diagnosis anxiety and underlying psychiatric disorder most likely the source of patient's behavior will discharge suspect this is just onset of menstruation with menstrual cramps.   She understands he may return to the ED at anytime  Procedures

## 2022-10-01 NOTE — ED TRIAGE NOTES
Pt brought in by EMS for vaginal bleeding and cramping that started this morning pt reported no clots pt reported it might be urine that the blood is in but she is unsure

## 2023-07-04 ENCOUNTER — HOSPITAL ENCOUNTER (EMERGENCY)
Facility: HOSPITAL | Age: 33
Discharge: HOME | End: 2023-07-05
Attending: EMERGENCY MEDICINE
Payer: MEDICARE

## 2023-07-04 DIAGNOSIS — F39 MOOD DISORDER (HCC): Primary | ICD-10-CM

## 2023-07-04 PROCEDURE — 99282 EMERGENCY DEPT VISIT SF MDM: CPT

## 2023-07-04 ASSESSMENT — LIFESTYLE VARIABLES
HOW OFTEN DO YOU HAVE A DRINK CONTAINING ALCOHOL: NEVER
HOW MANY STANDARD DRINKS CONTAINING ALCOHOL DO YOU HAVE ON A TYPICAL DAY: PATIENT DOES NOT DRINK

## 2023-07-04 ASSESSMENT — PAIN - FUNCTIONAL ASSESSMENT: PAIN_FUNCTIONAL_ASSESSMENT: NONE - DENIES PAIN

## 2023-07-05 VITALS
WEIGHT: 176.2 LBS | BODY MASS INDEX: 30.08 KG/M2 | SYSTOLIC BLOOD PRESSURE: 148 MMHG | HEIGHT: 64 IN | RESPIRATION RATE: 17 BRPM | DIASTOLIC BLOOD PRESSURE: 108 MMHG | HEART RATE: 86 BPM | TEMPERATURE: 98.4 F | OXYGEN SATURATION: 100 %

## 2023-07-05 ASSESSMENT — ENCOUNTER SYMPTOMS
RESPIRATORY NEGATIVE: 1
GASTROINTESTINAL NEGATIVE: 1

## 2023-07-05 NOTE — [facility] NOTE
situation  Previous Hospitalizations: Yes  Unknown if patient has been in restraints   Current Psychiatrist and/or  is none. Lethality Assessment:  The potential for suicide noted by the following: not noted. The potential for homicide is not noted. The patient has not been a perpetrator of sexual or physical abuse. There are not pending charges. The patient is not felt to be at risk for self harm or harm to others. Section III - Psychosocial  The patient's overall mood and attitude is depressed and anxious. Feelings of helplessness and hopelessness are not observed. Generalized anxiety is observed. Panic is not observed. Phobias are not observed. Obsessive compulsive tendencies are not observed. Section IV - Mental Status Exam  The patient's appearance shows no evidence of impairment. The patient's behavior shows no evidence of impairment. The patient is oriented to time, place, person and situation. The patient's speech shows no evidence of impairment. The patient's mood is depressed and is anxious. The range of affect is sad. The patient's thought content demonstrates no evidence of impairment . The thought process shows no evidence of impairment. The patient's perception shows no evidence of impairment. The patient's memory is impaired. The patient's appetite shows no evidence of impairment . The patient's sleep shows no evidence of impairment. The patient's insight shows no evidence of impairment. The patient's judgement is psychologically impaired. Section V - Substance Abuse  The patient is not using substances. The patient has experienced the following withdrawal symptoms: N/A. Section VI - Living Arrangements  The patient Single. The patient lives with son at [facility] [place]. The patient has one child, age 10 . The patient does plan to return home upon discharge. The patient does not have legal issues pending.  The patient's source of income comes from

## 2023-07-05 NOTE — ED TRIAGE NOTES
Pt brought in by police for a mental health problem. Pt states she has had thoughts of harming the girl that lives in the hotel room next to her for the past 2 days. She states she does not know this girl but Alton Cousin gives me bad vibes\". When asked if she wanted to kill this girl or anyone else she said \"no, I don't think I could ever kill anyone\". Pt denies suicidal ideations at this time. Pt reports no thoughts of harming herself within the past 6 months. Pt does have a hx of depression, psychosis, schizophrenia, and bipolar disorder. Pt states she was previously on Abilify but is not currently taking any medications at this time.

## 2023-07-19 ENCOUNTER — HOSPITAL ENCOUNTER (EMERGENCY)
Facility: HOSPITAL | Age: 33
Discharge: HOME | End: 2023-07-19
Attending: EMERGENCY MEDICINE
Payer: MEDICARE

## 2023-07-19 VITALS
BODY MASS INDEX: 29.37 KG/M2 | RESPIRATION RATE: 16 BRPM | WEIGHT: 172 LBS | HEIGHT: 64 IN | SYSTOLIC BLOOD PRESSURE: 132 MMHG | TEMPERATURE: 98 F | DIASTOLIC BLOOD PRESSURE: 93 MMHG | OXYGEN SATURATION: 100 % | HEART RATE: 54 BPM

## 2023-07-19 DIAGNOSIS — G44.209 TENSION HEADACHE: ICD-10-CM

## 2023-07-19 DIAGNOSIS — F41.9 ANXIETY: Primary | ICD-10-CM

## 2023-07-19 PROCEDURE — 6370000000 HC RX 637 (ALT 250 FOR IP): Performed by: EMERGENCY MEDICINE

## 2023-07-19 PROCEDURE — 99283 EMERGENCY DEPT VISIT LOW MDM: CPT

## 2023-07-19 RX ORDER — IBUPROFEN 200 MG
200 TABLET ORAL EVERY 8 HOURS PRN
Qty: 20 TABLET | Refills: 0 | Status: SHIPPED | OUTPATIENT
Start: 2023-07-19 | End: 2023-07-26

## 2023-07-19 RX ORDER — ACETAMINOPHEN 500 MG
1000 TABLET ORAL
Status: COMPLETED | OUTPATIENT
Start: 2023-07-19 | End: 2023-07-19

## 2023-07-19 RX ORDER — HYDROXYZINE PAMOATE 25 MG/1
50 CAPSULE ORAL 2 TIMES DAILY
Qty: 10 CAPSULE | Refills: 0 | Status: SHIPPED | OUTPATIENT
Start: 2023-07-19 | End: 2023-07-24

## 2023-07-19 RX ORDER — IBUPROFEN 800 MG/1
800 TABLET ORAL
Status: COMPLETED | OUTPATIENT
Start: 2023-07-19 | End: 2023-07-19

## 2023-07-19 RX ADMIN — IBUPROFEN 800 MG: 800 TABLET, FILM COATED ORAL at 23:51

## 2023-07-19 RX ADMIN — ACETAMINOPHEN 1000 MG: 500 TABLET ORAL at 23:50

## 2023-07-19 ASSESSMENT — LIFESTYLE VARIABLES
HOW MANY STANDARD DRINKS CONTAINING ALCOHOL DO YOU HAVE ON A TYPICAL DAY: PATIENT DOES NOT DRINK
HOW MANY STANDARD DRINKS CONTAINING ALCOHOL DO YOU HAVE ON A TYPICAL DAY: PATIENT DOES NOT DRINK
HOW OFTEN DO YOU HAVE A DRINK CONTAINING ALCOHOL: NEVER
HOW OFTEN DO YOU HAVE A DRINK CONTAINING ALCOHOL: NEVER

## 2023-07-19 ASSESSMENT — PAIN SCALES - GENERAL: PAINLEVEL_OUTOF10: 0

## 2023-07-19 ASSESSMENT — PAIN - FUNCTIONAL ASSESSMENT: PAIN_FUNCTIONAL_ASSESSMENT: 0-10

## 2023-07-20 NOTE — ED TRIAGE NOTES
Patient brought in voluntarily by [facility]. She states she has been suffering with depression and anxiety. Last took her abilify in June, 2023. States she and her son have been living in a motel, but she lost her job 14 days ago and is unable to pay for it. Patient's son is currently under the care of her sister. Denies SI/HI. Patient requesting help to get back on her medications and is concerned because she is currently homeless. Also complains of headache.

## 2023-07-20 NOTE — ED NOTES
Per Dr. Art Pimentel, patient medically cleared for [facility] eval without labs.       Brian Burt, RN  07/19/23 9536

## 2023-07-20 NOTE — ED NOTES
Patient stable at time of discharge. Reviewed discharge instructions and education. Patient verbalized understanding. Patient states no questions at this time.       Sara Slaughter RN  07/19/23 2903

## 2023-07-20 NOTE — ED PROVIDER NOTES
EMERGENCY DEPARTMENT HISTORY AND PHYSICAL EXAM    Date: 7/19/2023  Patient Name: Newton Baker    History of Presenting Illness     Chief Complaint   Patient presents with    Mental Health Problem    Depression    Anxiety       History Provided By: Patient    HPI: Newton Baker, 29 y.o. female   presents to the ED with cc of anxiety and depression. Patient with history of depression anxiety presents emergency room complaining of anxiety and depression due to her current living situation. Patient denies homicidal or suicidal ideation. Patient relates no history of psychosis. Patient states that she used to take Abilify for her depression but stopped taking it because she did not like the side effect. Patient states she has appointment to see her psychiatrist in 2 weeks. No physical complaint other than mild generalized headache since yesterday. No visual changes, paresthesia, motor dysfunction, dizziness, or motor dysfunction. No head injury. PCP: Maki Rosales MD    No current facility-administered medications on file prior to encounter. No current outpatient medications on file prior to encounter. Past History     Past Medical History:  Past Medical History:   Diagnosis Date    Asthma     Bipolar 1 disorder ([facility])     Depression     Psychosis ([facility])     Schizophrenia ([facility])        Past Surgical History:  History reviewed. No pertinent surgical history. Family History:  History reviewed. No pertinent family history. Social History:  Social History     Tobacco Use    Smoking status: Former     Packs/day: 0.25     Types: Cigarettes     Passive exposure: Never    Smokeless tobacco: Never   Vaping Use    Vaping Use: Never used   Substance Use Topics    Alcohol use: Not Currently    Drug use: Not Currently       Allergies:  No Known Allergies      Review of Systems       Physical Exam   Physical Exam  Vitals and nursing note reviewed.    Constitutional:       General: She is

## 2023-07-20 NOTE — BSMART NOTE
Comprehensive Assessment Form      Section I - Disposition    Primary Diagnosis: Adjustment Disorder with Anxiety  Secondary Diagnosis: Schizophrenia by history    Unsheltered Homelessness  Unemployment  Nonadherence to Medical Treatment    The Medical Doctor to Psychiatrist conference was not completed. The Medical Doctor is in agreement with Psychiatrist disposition because of (reason) the patient does not meet admission criteria. The plan is to discharge with referrals. The on-call Psychiatrist consulted was Dr. Charisma Hurst. The admitting Psychiatrist will be Dr. Charisma Hurst. The admitting Diagnosis is N/A. The Payor source is Medicare Medicaid. This writer reviewed the [address] [place] [address] in nursing flowsheet and the risk level assigned is NO risk. Based on this assessment, the risk of suicide is NO risk and the plan is discharge. Section II - Integrated Summary  Summary:  The patient presented to the ED requesting voluntary admission for depression and anxiety. She denied SI, HI, hallucinations, and current substance use. The patient stated that she has become homeless and sent her son to live with her mother. She has also stopped taking psychiatric medication for psychosis, however she is not displaying or reporting psychotic symptoms today. The patient agreed that her symptoms would resolve if she had employment and stable housing. The patient reported symptoms including depressed mood, increased appetite, difficulty focusing/concentrating, racing thoughts, and 24/7 anxiety with racing thoughts. She reported stressors including unemployment, homelessness, and separation from her son. The patient cited support from her friends. She reported a history of taking psychiatric medication, but has not been taking them currently. Most recent hospitalization was 8 years ago. The patient has no current psychiatrist or therapist, but is interested in both.   The writer offered resources for

## 2023-07-20 NOTE — ED NOTES
Kevin Kaplan with [employer] to bedside to perform virtual interview of patient.       Lawrence Vidales RN  07/19/23 4726

## 2023-07-20 NOTE — [facility]SMART NOTE
[facility] assessment completed, and suicide risk level noted to be NONE. Primary Nurse Andrea Navarro and Physician Dr Conrad Nuñez notified. Concerns not observed. Security/Off- has not been notified.

## 2023-09-08 ENCOUNTER — HOSPITAL ENCOUNTER (EMERGENCY)
Facility: HOSPITAL | Age: 33
Discharge: HOME | End: 2023-09-08
Attending: EMERGENCY MEDICINE
Payer: MEDICARE

## 2023-09-08 VITALS
TEMPERATURE: 98 F | HEIGHT: 64 IN | OXYGEN SATURATION: 96 % | HEART RATE: 89 BPM | SYSTOLIC BLOOD PRESSURE: 151 MMHG | RESPIRATION RATE: 19 BRPM | BODY MASS INDEX: 29.37 KG/M2 | DIASTOLIC BLOOD PRESSURE: 96 MMHG | WEIGHT: 172 LBS

## 2023-09-08 DIAGNOSIS — Z04.6 PSYCHIATRIC EXAM REQUESTED BY AUTHORITY: Primary | ICD-10-CM

## 2023-09-08 LAB
ALBUMIN SERPL-MCNC: 3.8 G/DL (ref 3.5–5)
ALBUMIN/GLOB SERPL: 1.1 (ref 1.1–2.2)
ALP SERPL-CCNC: 49 U/L (ref 45–117)
ALT SERPL-CCNC: 24 U/L (ref 12–78)
AMPHET UR QL SCN: NEGATIVE
ANION GAP SERPL CALC-SCNC: 5 MMOL/L (ref 5–15)
APAP SERPL-MCNC: <2 UG/ML (ref 10–30)
APPEARANCE UR: CLEAR
AST SERPL W P-5'-P-CCNC: 15 U/L (ref 15–37)
BACTERIA URNS QL MICRO: ABNORMAL /HPF
BARBITURATES UR QL SCN: NEGATIVE
BASOPHILS # BLD: 0.1 K/UL (ref 0–0.1)
BASOPHILS NFR BLD: 1 % (ref 0–1)
BENZODIAZ UR QL: NEGATIVE
BILIRUB SERPL-MCNC: 0.3 MG/DL (ref 0.2–1)
BILIRUB UR QL: NEGATIVE
BUN SERPL-MCNC: 12 MG/DL (ref 6–20)
BUN/CREAT SERPL: 11 (ref 12–20)
CA-I BLD-MCNC: 8.9 MG/DL (ref 8.5–10.1)
CANNABINOIDS UR QL SCN: NEGATIVE
CHLORIDE SERPL-SCNC: 108 MMOL/L (ref 97–108)
CO2 SERPL-SCNC: 29 MMOL/L (ref 21–32)
COCAINE UR QL SCN: NEGATIVE
COLOR UR: ABNORMAL
CREAT SERPL-MCNC: 1.08 MG/DL (ref 0.55–1.02)
DATE LAST DOSE: ABNORMAL
DATE LAST DOSE: ABNORMAL
DIFFERENTIAL METHOD BLD: ABNORMAL
DOSE AMOUNT: ABNORMAL UNITS
DOSE AMOUNT: ABNORMAL UNITS
EKG ATRIAL RATE: 78 BPM
EKG DIAGNOSIS: NORMAL
EKG P AXIS: 66 DEGREES
EKG P-R INTERVAL: 137 MS
EKG Q-T INTERVAL: 360 MS
EKG QRS DURATION: 78 MS
EKG QTC CALCULATION (BAZETT): 408 MS
EKG R AXIS: 47 DEGREES
EKG T AXIS: 10 DEGREES
EKG VENTRICULAR RATE: 77 BPM
EOSINOPHIL # BLD: 0 K/UL (ref 0–0.4)
EOSINOPHIL NFR BLD: 0 % (ref 0–7)
ERYTHROCYTE [DISTWIDTH] IN BLOOD BY AUTOMATED COUNT: 15.7 % (ref 11.5–14.5)
ETHANOL SERPL-MCNC: <10 MG/DL (ref 0–0.08)
FLUAV RNA SPEC QL NAA+PROBE: NOT DETECTED
FLUBV RNA SPEC QL NAA+PROBE: NOT DETECTED
GLOBULIN SER CALC-MCNC: 3.6 G/DL (ref 2–4)
GLUCOSE SERPL-MCNC: 102 MG/DL (ref 65–100)
GLUCOSE UR STRIP.AUTO-MCNC: NEGATIVE MG/DL
HCT VFR BLD AUTO: 37.3 % (ref 35–47)
HGB BLD-MCNC: 12 G/DL (ref 11.5–16)
HGB UR QL STRIP: NEGATIVE
IMM GRANULOCYTES # BLD AUTO: 0 K/UL (ref 0–0.04)
IMM GRANULOCYTES NFR BLD AUTO: 0 % (ref 0–0.5)
KETONES UR QL STRIP.AUTO: ABNORMAL MG/DL
LEUKOCYTE ESTERASE UR QL STRIP.AUTO: ABNORMAL
LYMPHOCYTES # BLD: 2.8 K/UL (ref 0.8–3.5)
LYMPHOCYTES NFR BLD: 30 % (ref 12–49)
Lab: NORMAL
MAGNESIUM SERPL-MCNC: 1.8 MG/DL (ref 1.6–2.4)
MCH RBC QN AUTO: 29 PG (ref 26–34)
MCHC RBC AUTO-ENTMCNC: 32.2 G/DL (ref 30–36.5)
MCV RBC AUTO: 90.1 FL (ref 80–99)
MDMA URINE: NEGATIVE
METHADONE UR QL: NEGATIVE
MONOCYTES # BLD: 0.8 K/UL (ref 0–1)
MONOCYTES NFR BLD: 8 % (ref 5–13)
NEUTS SEG # BLD: 5.7 K/UL (ref 1.8–8)
NEUTS SEG NFR BLD: 61 % (ref 32–75)
NITRITE UR QL STRIP.AUTO: NEGATIVE
NRBC # BLD: 0 K/UL (ref 0–0.01)
NRBC BLD-RTO: 0 PER 100 WBC
OPIATES UR QL: NEGATIVE
PCP UR QL: NEGATIVE
PH UR STRIP: 6 (ref 5–8)
PLATELET # BLD AUTO: 272 K/UL (ref 150–400)
PMV BLD AUTO: 10 FL (ref 8.9–12.9)
POTASSIUM SERPL-SCNC: 4 MMOL/L (ref 3.5–5.1)
PROT SERPL-MCNC: 7.4 G/DL (ref 6.4–8.2)
PROT UR STRIP-MCNC: NEGATIVE MG/DL
RBC # BLD AUTO: 4.14 M/UL (ref 3.8–5.2)
RBC #/AREA URNS HPF: ABNORMAL /HPF (ref 0–3)
SALICYLATES SERPL-MCNC: 2.3 MG/DL (ref 2.8–20)
SARS-COV-2 RNA RESP QL NAA+PROBE: NOT DETECTED
SODIUM SERPL-SCNC: 142 MMOL/L (ref 136–145)
SP GR UR REFRACTOMETRY: >1.03 (ref 1–1.03)
UROBILINOGEN UR QL STRIP.AUTO: 1 EU/DL (ref 0.2–1)
WBC # BLD AUTO: 9.4 K/UL (ref 3.6–11)
WBC URNS QL MICRO: ABNORMAL /HPF (ref 0–5)

## 2023-09-08 PROCEDURE — 87636 SARSCOV2 & INF A&B AMP PRB: CPT

## 2023-09-08 PROCEDURE — 80053 COMPREHEN METABOLIC PANEL: CPT

## 2023-09-08 PROCEDURE — 85025 COMPLETE CBC W/AUTO DIFF WBC: CPT

## 2023-09-08 PROCEDURE — 99284 EMERGENCY DEPT VISIT MOD MDM: CPT

## 2023-09-08 PROCEDURE — 80307 DRUG TEST PRSMV CHEM ANLYZR: CPT

## 2023-09-08 PROCEDURE — 83735 ASSAY OF MAGNESIUM: CPT

## 2023-09-08 PROCEDURE — 82077 ASSAY SPEC XCP UR&BREATH IA: CPT

## 2023-09-08 PROCEDURE — 36415 COLL VENOUS BLD VENIPUNCTURE: CPT

## 2023-09-08 PROCEDURE — 81001 URINALYSIS AUTO W/SCOPE: CPT

## 2023-09-08 PROCEDURE — 80179 DRUG ASSAY SALICYLATE: CPT

## 2023-09-08 PROCEDURE — 80143 DRUG ASSAY ACETAMINOPHEN: CPT

## 2023-09-08 NOTE — ED NOTES
Radha Brantley with [employer] called and informed will not be recommending TDO at this time she spoke with Aamir Hernández and patient at this time as well     Ella Angeles RN  09/08/23 4675

## 2023-09-08 NOTE — ED NOTES
Patient sitting quietly on bed lab at bedside to draw blood at this time     Orlando Aviles RN  09/08/23 8414

## 2023-09-08 NOTE — ED PROVIDER NOTES
[facility] EMERGENCY DEPT  EMERGENCY DEPARTMENT HISTORY AND PHYSICAL EXAM      Date: 9/8/2023  Patient Name: Roberto Medellin  MRN: [id number]  [address] 1990  Date of evaluation: 9/8/2023  Provider: Emiliano Jay MD   Note Started: 3:02 PM EDT 9/8/23    HISTORY OF PRESENT ILLNESS     Chief Complaint   Patient presents with    Psychiatric Evaluation       History Provided By: Patient    HPI: Roberto Medellin is a 29 y.o. female tearful, not sure why she is here. States she was at work when police came ot pick her up. States she did have an argument with her mother this morning but denies HI or SI. States mother is attempting to take her daughter away. Pt brought in under an ECO issued at 1430. ECO was taken out by pt mother who states pt is bipolar and has stopped taking her medications. Mother reports pt has threatened to harm her. PAST MEDICAL HISTORY   Past Medical History:  Past Medical History:   Diagnosis Date    Asthma     Bipolar 1 disorder ([facility])     Depression     Psychosis ([facility])     Schizophrenia ([facility])        Past Surgical History:  History reviewed. No pertinent surgical history. Family History:  History reviewed. No pertinent family history. Social History:  Social History     Tobacco Use    Smoking status: Former     Packs/day: 0.25     Types: Cigarettes     Passive exposure: Never    Smokeless tobacco: Never   Vaping Use    Vaping Use: Never used   Substance Use Topics    Alcohol use: Not Currently    Drug use: Not Currently       Allergies:  No Known Allergies    PCP: Leno Mirza MD    Current Meds:   No current facility-administered medications for this encounter.      Current Outpatient Medications   Medication Sig Dispense Refill    ibuprofen (ADVIL) 200 MG tablet Take 1 tablet by mouth every 8 hours as needed for Pain 20 tablet 0       Social Determinants of Health:   Social Determinants of Health     Tobacco Use: Medium Risk    Smoking Tobacco Use: Former Smokeless Tobacco Use: Never    Passive Exposure: Never   Alcohol Use: Not At Risk    Frequency of Alcohol Consumption: Never    Average Number of Drinks: Patient does not drink    Frequency of Binge Drinking: Never   Financial Resource Strain: Not on file   Food Insecurity: Not on file   Transportation Needs: Not on file   Physical Activity: Not on file   Stress: Not on file   Social Connections: Not on file   Intimate Partner Violence: Not on file   Depression: Not on file   Housing Stability: Not on file       PHYSICAL EXAM   Physical Exam  Vitals and nursing note reviewed. Constitutional:       Appearance: Normal appearance. HENT:      Head: Normocephalic and atraumatic. Right Ear: External ear normal.      Left Ear: External ear normal.      Nose: Nose normal.      Mouth/Throat:      Mouth: Mucous membranes are moist.   Cardiovascular:      Rate and Rhythm: Normal rate and regular rhythm. Pulmonary:      Effort: Pulmonary effort is normal.   Abdominal:      Palpations: Abdomen is soft. Musculoskeletal:         General: Normal range of motion. Cervical back: Normal range of motion. Skin:     General: Skin is warm. Capillary Refill: Capillary refill takes less than 2 seconds. Neurological:      General: No focal deficit present. Mental Status: She is alert.    Psychiatric:         Mood and Affect: Mood normal.         Behavior: Behavior normal.          SCREENINGS               LAB, EKG AND DIAGNOSTIC RESULTS   Labs:  Recent Results (from the past 12 hour(s))   CBC with Auto Differential    Collection Time: 09/08/23  3:20 PM   Result Value Ref Range    WBC 9.4 3.6 - 11.0 K/uL    RBC 4.14 3.80 - 5.20 M/uL    Hemoglobin 12.0 11.5 - 16.0 g/dL    Hematocrit 37.3 35.0 - 47.0 %    MCV 90.1 80.0 - 99.0 FL    MCH 29.0 26.0 - 34.0 PG    MCHC 32.2 30.0 - 36.5 g/dL    RDW 15.7 (H) 11.5 - 14.5 %    Platelets 612 490 - 380 K/uL    MPV 10.0 8.9 - 12.9 FL    Nucleated RBCs 0.0 0.0  WBC

## 2023-09-08 NOTE — ED TRIAGE NOTES
Pt brought in under an ECO issued at 1430. ECO was taken out by pt mother who states pt is bipolar and has stopped taking her medications. Mother reports pt has threatened to harm her. Pt uncooperative at triage and will not answer questions. Pt requesting a .

## 2023-09-12 LAB
EKG ATRIAL RATE: 78 BPM
EKG DIAGNOSIS: NORMAL
EKG P AXIS: 66 DEGREES
EKG P-R INTERVAL: 137 MS
EKG Q-T INTERVAL: 360 MS
EKG QRS DURATION: 78 MS
EKG QTC CALCULATION (BAZETT): 408 MS
EKG R AXIS: 47 DEGREES
EKG T AXIS: 10 DEGREES
EKG VENTRICULAR RATE: 77 BPM

## 2023-09-30 ENCOUNTER — HOSPITAL ENCOUNTER (EMERGENCY)
Facility: HOSPITAL | Age: 33
Discharge: HOME | End: 2023-09-30
Attending: EMERGENCY MEDICINE
Payer: MEDICARE

## 2023-09-30 VITALS
OXYGEN SATURATION: 98 % | DIASTOLIC BLOOD PRESSURE: 104 MMHG | SYSTOLIC BLOOD PRESSURE: 156 MMHG | TEMPERATURE: 98.8 F | RESPIRATION RATE: 18 BRPM | HEART RATE: 75 BPM

## 2023-09-30 DIAGNOSIS — B34.9 VIRAL SYNDROME: Primary | ICD-10-CM

## 2023-09-30 PROCEDURE — 99282 EMERGENCY DEPT VISIT SF MDM: CPT

## 2023-09-30 NOTE — DISCHARGE INSTRUCTIONS
You were seen in the ER for your COVID/Flu-like symptoms. Thankfully, your vital signs are all normal, including your oxygen and your heart rate. You should take tylenol or ibuprofen for fever, aches and pains for the next few days. You can alternate these every 3 hours so that you always have something on board. For instance, you can take tylenol at 9am, ibuprofen at noon, tylenol again at 3pm and ibuprofen again at 6pm. Take in plenty of water to stay hydrated and follow up with your primary care doctor in the next few days. Return to the ER for any uncontrollable vomiting or diarrhea, difficulty breathing, or any other new or concerning symptoms.

## 2023-09-30 NOTE — ED PROVIDER NOTES
EMERGENCY DEPARTMENT HISTORY AND PHYSICAL EXAM      Date: (Not on file)  Patient Name: Ziggy Swann      History of Presenting Illness     Chief Complaint   Patient presents with    Flu symptoms       History Provided By: patient    HPI: Ziggy Swann, 29 y.o. female with a past medical history significant for denies presents to the ED with cc of flu-like sx. Onset 2d ago, today is 3rd day of sx. Began with malaise, bodyaches, cough. Yesterday slept all day. Feels a bit SOB from nasal congestion. Denies N/V/D. No known sick contacts. There are no other complaints, changes, or physical findings at this time. PCP: Ada Erickson MD    No current facility-administered medications for this encounter. Current Outpatient Medications   Medication Sig Dispense Refill    ibuprofen (ADVIL) 200 MG tablet Take 1 tablet by mouth every 8 hours as needed for Pain 20 tablet 0       Past History     Past Medical History:  Past Medical History:   Diagnosis Date    Asthma     Bipolar 1 disorder ([facility])     Depression     Psychosis ([facility])     Schizophrenia ([facility])        Past Surgical History:  History reviewed. No pertinent surgical history. Family History:  History reviewed. No pertinent family history. Social History:  Social History     Tobacco Use    Smoking status: Former     Packs/day: .25     Types: Cigarettes     Passive exposure: Never    Smokeless tobacco: Never   Vaping Use    Vaping Use: Never used   Substance Use Topics    Alcohol use: Not Currently    Drug use: Not Currently       Allergies:  No Known Allergies      Review of Systems   Constitutional: Negative except as in HPI. Eyes: Negative except as in HPI.  ENT: Negative except as in HPI. Cardiovascular: Negative except as in HPI. Respiratory: Negative except as in HPI. Gastrointestinal: Negative except as in HPI. Genitourinary: Negative except as in HPI. Musculoskeletal: Negative except as in HPI.   Integumentary: Negative except

## 2023-09-30 NOTE — ED TRIAGE NOTES
PT reports flu like symptoms x 3 days. Pt denies being around anyone flu positive. PT texting during triage and not attentive to writers questions.

## 2023-10-08 ENCOUNTER — HOSPITAL ENCOUNTER (EMERGENCY)
Facility: HOSPITAL | Age: 33
Discharge: HOME | End: 2023-10-08
Attending: EMERGENCY MEDICINE
Payer: MEDICARE

## 2023-10-08 VITALS
RESPIRATION RATE: 18 BRPM | WEIGHT: 175.5 LBS | HEIGHT: 66 IN | TEMPERATURE: 97.7 F | DIASTOLIC BLOOD PRESSURE: 100 MMHG | SYSTOLIC BLOOD PRESSURE: 165 MMHG | BODY MASS INDEX: 28.21 KG/M2 | HEART RATE: 78 BPM | OXYGEN SATURATION: 100 %

## 2023-10-08 DIAGNOSIS — U07.1 COVID: Primary | ICD-10-CM

## 2023-10-08 LAB
FLUAV RNA SPEC QL NAA+PROBE: NOT DETECTED
FLUBV RNA SPEC QL NAA+PROBE: NOT DETECTED
SARS-COV-2 RNA RESP QL NAA+PROBE: DETECTED

## 2023-10-08 PROCEDURE — 99283 EMERGENCY DEPT VISIT LOW MDM: CPT

## 2023-10-08 PROCEDURE — 87636 SARSCOV2 & INF A&B AMP PRB: CPT

## 2023-10-08 ASSESSMENT — ENCOUNTER SYMPTOMS
COUGH: 1
EYES NEGATIVE: 1
ALLERGIC/IMMUNOLOGIC NEGATIVE: 1
GASTROINTESTINAL NEGATIVE: 1

## 2023-10-08 NOTE — ED PROVIDER NOTES
[facility] EMERGENCY DEPT  EMERGENCY DEPARTMENT ENCOUNTER      Pt Name: Charley Vieyra  MRN: [id number]  [address] [facility] 1990  Date of evaluation: 10/8/2023  Provider: Jimena John MD    [address] [facility]       Chief Complaint   Patient presents with    URI         HISTORY OF PRESENT ILLNESS   (Location/Symptom, Timing/Onset, Context/Setting, Quality, Duration, Modifying Factors, Severity)  Note limiting factors. Charley Vieyra is a 29 y.o. female who presents to the emergency department with complaint of cough and runny nose for past 2 weeks. Saw PCP for same. No fever or chills. No shortness of breath . No other associated signs or symptoms. HPI    Nursing Notes were reviewed. REVIEW OF SYSTEMS    (2-9 systems for level 4, 10 or more for level 5)     Review of Systems   Constitutional: Negative. HENT:  Positive for congestion. Eyes: Negative. Respiratory:  Positive for cough. Cardiovascular: Negative. Gastrointestinal: Negative. Endocrine: Negative. Genitourinary: Negative. Musculoskeletal: Negative. Allergic/Immunologic: Negative. Neurological: Negative. Hematological: Negative. Psychiatric/Behavioral: Negative. Except as noted above the remainder of the review of systems was reviewed and negative. PAST MEDICAL HISTORY     Past Medical History:   Diagnosis Date    Asthma     Bipolar 1 disorder ([facility])     Depression     Psychosis ([facility])     Schizophrenia ([facility])          SURGICAL HISTORY     History reviewed. No pertinent surgical history. CURRENT MEDICATIONS       Previous Medications    IBUPROFEN (ADVIL) 200 MG TABLET    Take 1 tablet by mouth every 8 hours as needed for Pain       ALLERGIES     Penicillins and Peanut-containing drug products    FAMILY HISTORY     History reviewed. No pertinent family history.        SOCIAL HISTORY       Social History     Socioeconomic History    Marital status: Single     Spouse name: None    Number of children: None

## 2023-12-29 ENCOUNTER — HOSPITAL ENCOUNTER (EMERGENCY)
Facility: HOSPITAL | Age: 33
Discharge: HOME | End: 2023-12-29

## 2023-12-29 VITALS
HEART RATE: 91 BPM | TEMPERATURE: 98.1 F | DIASTOLIC BLOOD PRESSURE: 104 MMHG | HEIGHT: 63 IN | OXYGEN SATURATION: 100 % | WEIGHT: 175 LBS | RESPIRATION RATE: 19 BRPM | BODY MASS INDEX: 31.01 KG/M2 | SYSTOLIC BLOOD PRESSURE: 132 MMHG

## 2023-12-29 NOTE — ED TRIAGE NOTES
Patient reports bilateral leg and foot pain that is chronic because she walks every where patient cannot tell when same started but always has it

## 2024-03-29 ENCOUNTER — HOSPITAL ENCOUNTER (EMERGENCY)
Facility: HOSPITAL | Age: 34
Discharge: ANOTHER HOSPITAL | End: 2024-03-29
Attending: EMERGENCY MEDICINE
Payer: MEDICARE

## 2024-03-29 VITALS
BODY MASS INDEX: 31 KG/M2 | HEIGHT: 63 IN | RESPIRATION RATE: 19 BRPM | OXYGEN SATURATION: 99 % | SYSTOLIC BLOOD PRESSURE: 132 MMHG | DIASTOLIC BLOOD PRESSURE: 82 MMHG | TEMPERATURE: 98 F | HEART RATE: 71 BPM

## 2024-03-29 DIAGNOSIS — F23 ACUTE PSYCHOSIS (HCC): ICD-10-CM

## 2024-03-29 DIAGNOSIS — R45.851 SUICIDAL IDEATIONS: Primary | ICD-10-CM

## 2024-03-29 LAB
ALBUMIN SERPL-MCNC: 3.9 G/DL (ref 3.5–5)
ALBUMIN/GLOB SERPL: 1 (ref 1.1–2.2)
ALP SERPL-CCNC: 54 U/L (ref 45–117)
ALT SERPL-CCNC: 20 U/L (ref 12–78)
AMPHET UR QL SCN: NEGATIVE
ANION GAP SERPL CALC-SCNC: 10 MMOL/L (ref 5–15)
APAP SERPL-MCNC: <2 UG/ML (ref 10–30)
APPEARANCE UR: CLEAR
AST SERPL W P-5'-P-CCNC: 15 U/L (ref 15–37)
BACTERIA URNS QL MICRO: ABNORMAL /HPF
BARBITURATES UR QL SCN: NEGATIVE
BASOPHILS # BLD: 0.1 K/UL (ref 0–0.1)
BASOPHILS NFR BLD: 0 % (ref 0–1)
BENZODIAZ UR QL: NEGATIVE
BILIRUB SERPL-MCNC: 0.3 MG/DL (ref 0.2–1)
BILIRUB UR QL: NEGATIVE
BUN SERPL-MCNC: 12 MG/DL (ref 6–20)
BUN/CREAT SERPL: 12 (ref 12–20)
CA-I BLD-MCNC: 9.1 MG/DL (ref 8.5–10.1)
CANNABINOIDS UR QL SCN: NEGATIVE
CAOX CRY URNS QL MICRO: ABNORMAL
CHLORIDE SERPL-SCNC: 102 MMOL/L (ref 97–108)
CO2 SERPL-SCNC: 28 MMOL/L (ref 21–32)
COCAINE UR QL SCN: NEGATIVE
COLOR UR: ABNORMAL
CREAT SERPL-MCNC: 1.03 MG/DL (ref 0.55–1.02)
DATE LAST DOSE: ABNORMAL
DATE LAST DOSE: ABNORMAL
DIFFERENTIAL METHOD BLD: ABNORMAL
DOSE AMOUNT: ABNORMAL UNITS
DOSE AMOUNT: ABNORMAL UNITS
EOSINOPHIL # BLD: 0 K/UL (ref 0–0.4)
EOSINOPHIL NFR BLD: 0 % (ref 0–7)
ERYTHROCYTE [DISTWIDTH] IN BLOOD BY AUTOMATED COUNT: 14.9 % (ref 11.5–14.5)
ETHANOL SERPL-MCNC: <10 MG/DL (ref 0–0.08)
FLUAV RNA SPEC QL NAA+PROBE: NOT DETECTED
FLUBV RNA SPEC QL NAA+PROBE: NOT DETECTED
GLOBULIN SER CALC-MCNC: 4 G/DL (ref 2–4)
GLUCOSE SERPL-MCNC: 91 MG/DL (ref 65–100)
GLUCOSE UR STRIP.AUTO-MCNC: NEGATIVE MG/DL
HCG UR QL: NEGATIVE
HCT VFR BLD AUTO: 36.8 % (ref 35–47)
HGB BLD-MCNC: 12 G/DL (ref 11.5–16)
HGB UR QL STRIP: ABNORMAL
IMM GRANULOCYTES # BLD AUTO: 0 K/UL (ref 0–0.04)
IMM GRANULOCYTES NFR BLD AUTO: 0 % (ref 0–0.5)
KETONES UR QL STRIP.AUTO: ABNORMAL MG/DL
LEUKOCYTE ESTERASE UR QL STRIP.AUTO: NEGATIVE
LYMPHOCYTES # BLD: 2 K/UL (ref 0.8–3.5)
LYMPHOCYTES NFR BLD: 17 % (ref 12–49)
Lab: NORMAL
MAGNESIUM SERPL-MCNC: 1.7 MG/DL (ref 1.6–2.4)
MCH RBC QN AUTO: 29 PG (ref 26–34)
MCHC RBC AUTO-ENTMCNC: 32.6 G/DL (ref 30–36.5)
MCV RBC AUTO: 88.9 FL (ref 80–99)
MDMA URINE: NEGATIVE
METHADONE UR QL: NEGATIVE
MONOCYTES # BLD: 0.8 K/UL (ref 0–1)
MONOCYTES NFR BLD: 7 % (ref 5–13)
NEUTS SEG # BLD: 8.9 K/UL (ref 1.8–8)
NEUTS SEG NFR BLD: 76 % (ref 32–75)
NITRITE UR QL STRIP.AUTO: NEGATIVE
NRBC # BLD: 0 K/UL (ref 0–0.01)
NRBC BLD-RTO: 0 PER 100 WBC
OPIATES UR QL: NEGATIVE
PCP UR QL: NEGATIVE
PH UR STRIP: 6 (ref 5–8)
PLATELET # BLD AUTO: 257 K/UL (ref 150–400)
PMV BLD AUTO: 10.3 FL (ref 8.9–12.9)
POTASSIUM SERPL-SCNC: 3.7 MMOL/L (ref 3.5–5.1)
PROT SERPL-MCNC: 7.9 G/DL (ref 6.4–8.2)
PROT UR STRIP-MCNC: NEGATIVE MG/DL
RBC # BLD AUTO: 4.14 M/UL (ref 3.8–5.2)
RBC #/AREA URNS HPF: ABNORMAL /HPF (ref 0–3)
SALICYLATES SERPL-MCNC: <1.7 MG/DL (ref 2.8–20)
SARS-COV-2 RNA RESP QL NAA+PROBE: NOT DETECTED
SODIUM SERPL-SCNC: 140 MMOL/L (ref 136–145)
SP GR UR REFRACTOMETRY: >1.03 (ref 1–1.03)
UROBILINOGEN UR QL STRIP.AUTO: 1 EU/DL (ref 0.2–1)
WBC # BLD AUTO: 11.8 K/UL (ref 3.6–11)
WBC URNS QL MICRO: ABNORMAL /HPF (ref 0–5)

## 2024-03-29 PROCEDURE — 82077 ASSAY SPEC XCP UR&BREATH IA: CPT

## 2024-03-29 PROCEDURE — 99285 EMERGENCY DEPT VISIT HI MDM: CPT

## 2024-03-29 PROCEDURE — 80179 DRUG ASSAY SALICYLATE: CPT

## 2024-03-29 PROCEDURE — 83735 ASSAY OF MAGNESIUM: CPT

## 2024-03-29 PROCEDURE — 87636 SARSCOV2 & INF A&B AMP PRB: CPT

## 2024-03-29 PROCEDURE — 85025 COMPLETE CBC W/AUTO DIFF WBC: CPT

## 2024-03-29 PROCEDURE — 81025 URINE PREGNANCY TEST: CPT

## 2024-03-29 PROCEDURE — 80307 DRUG TEST PRSMV CHEM ANLYZR: CPT

## 2024-03-29 PROCEDURE — 36415 COLL VENOUS BLD VENIPUNCTURE: CPT

## 2024-03-29 PROCEDURE — 81001 URINALYSIS AUTO W/SCOPE: CPT

## 2024-03-29 PROCEDURE — 80053 COMPREHEN METABOLIC PANEL: CPT

## 2024-03-29 PROCEDURE — 80143 DRUG ASSAY ACETAMINOPHEN: CPT

## 2024-03-29 RX ORDER — HYDROXYZINE HYDROCHLORIDE 25 MG/1
25 TABLET, FILM COATED ORAL 3 TIMES DAILY PRN
Status: ON HOLD | COMMUNITY
End: 2024-03-30

## 2024-03-29 RX ORDER — ARIPIPRAZOLE 5 MG/1
5 TABLET ORAL DAILY
Status: ON HOLD | COMMUNITY
End: 2024-04-01 | Stop reason: ALTCHOICE

## 2024-03-29 ASSESSMENT — PAIN - FUNCTIONAL ASSESSMENT: PAIN_FUNCTIONAL_ASSESSMENT: 0-10

## 2024-03-29 ASSESSMENT — PAIN SCALES - GENERAL: PAINLEVEL_OUTOF10: 0

## 2024-03-29 NOTE — ED TRIAGE NOTES
Pt arrives via [place] police for throwing fixates and personal items through windows in her apartment. Pt has forensic cuffs on on arrival, ambulatory to room. Pt hid in closet from police and was cooperative after. Pt denies SI/HI. Pt states she does not want to speak to nursing staff, only police.

## 2024-03-29 NOTE — ED PROVIDER NOTES
[facility] EMERGENCY DEPT  EMERGENCY DEPARTMENT HISTORY AND PHYSICAL EXAM      Date: 3/29/2024  Patient Name: Kaitlin Crespo  MRN: [id number]  YOB: 1990  Date of evaluation: 3/29/2024  Provider: Hayley Weiss MD   Note Started: 4:16 PM EDT 3/29/24    HISTORY OF PRESENT ILLNESS     Chief Complaint   Patient presents with    Mental Health Problem       History Provided By: Patient    HPI: Kaitlin Crespo is a 33 y.o. female     PAST MEDICAL HISTORY   Past Medical History:  Past Medical History:   Diagnosis Date    Asthma     Bipolar 1 disorder ([facility])     Depression     Psychosis ([facility])     Schizophrenia ([facility])        Past Surgical History:  History reviewed. No pertinent surgical history.    Family History:  History reviewed. No pertinent family history.    Social History:  Social History     Tobacco Use    Smoking status: Former     Current packs/day: 0.25     Types: Cigarettes     Passive exposure: Never    Smokeless tobacco: Never   Vaping Use    Vaping Use: Never used   Substance Use Topics    Alcohol use: Not Currently    Drug use: Not Currently       Allergies:  Allergies   Allergen Reactions    Penicillins Hives    Peanut-Containing Drug Products Hives and Swelling       PCP: Benjamin Dimas Sr., MD    Current Meds:   No current facility-administered medications for this encounter.     Current Outpatient Medications   Medication Sig Dispense Refill    hydrOXYzine HCl (ATARAX) 25 MG tablet Take 1 tablet by mouth 3 times daily as needed for Itching      sertraline (ZOLOFT) 50 MG tablet Take 1 tablet by mouth daily      ARIPiprazole (ABILIFY) 5 MG tablet Take 1 tablet by mouth daily      ibuprofen (ADVIL) 200 MG tablet Take 1 tablet by mouth every 8 hours as needed for Pain 20 tablet 0       Social Determinants of Health:   Social Determinants of Health     Tobacco Use: Medium Risk (3/29/2024)    Patient History     Smoking Tobacco Use: Former     Smokeless Tobacco Use: Never     Passive  to person, place, and time.   Psychiatric:         Attention and Perception: Attention and perception normal.         Mood and Affect: Mood normal.         Speech: Speech normal.         Behavior: Behavior normal. Behavior is cooperative.         Thought Content: Thought content is not paranoid or delusional. Thought content does not include homicidal or suicidal ideation. Thought content does not include homicidal or suicidal plan.         Cognition and Memory: Cognition and memory normal.         Judgment: Judgment is impulsive and inappropriate.           SCREENINGS              LAB, EKG AND DIAGNOSTIC RESULTS   Labs:  No results found for this or any previous visit (from the past 12 hour(s)).    EKG: Not Applicable    Radiologic Studies:  Non-plain film images such as CT, Ultrasound and MRI are read by the radiologist. Plain radiographic images are visualized and preliminarily interpreted by the ED Physician with the following findings: Not Applicable.    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

## 2024-03-30 ENCOUNTER — HOSPITAL ENCOUNTER (INPATIENT)
Facility: HOSPITAL | Age: 34
LOS: 9 days | Discharge: HOME | DRG: 885 | End: 2024-04-08
Attending: PSYCHIATRY & NEUROLOGY | Admitting: PSYCHIATRY & NEUROLOGY
Payer: MEDICARE

## 2024-03-30 PROBLEM — F31.9 BIPOLAR DISORDER (HCC): Status: ACTIVE | Noted: 2024-03-30

## 2024-03-30 PROCEDURE — 6370000000 HC RX 637 (ALT 250 FOR IP): Performed by: NURSE PRACTITIONER

## 2024-03-30 PROCEDURE — 1240000000 HC EMOTIONAL WELLNESS R&B

## 2024-03-30 RX ORDER — IBUPROFEN 400 MG/1
400 TABLET ORAL EVERY 6 HOURS PRN
Status: DISCONTINUED | OUTPATIENT
Start: 2024-03-30 | End: 2024-04-04

## 2024-03-30 RX ORDER — HALOPERIDOL 5 MG/1
5 TABLET ORAL EVERY 4 HOURS PRN
Status: DISCONTINUED | OUTPATIENT
Start: 2024-03-30 | End: 2024-04-08 | Stop reason: HOSPADM

## 2024-03-30 RX ORDER — ACETAMINOPHEN 325 MG/1
650 TABLET ORAL EVERY 4 HOURS PRN
Status: DISCONTINUED | OUTPATIENT
Start: 2024-03-30 | End: 2024-04-04

## 2024-03-30 RX ORDER — TRAZODONE HYDROCHLORIDE 50 MG/1
50 TABLET ORAL NIGHTLY PRN
Status: DISCONTINUED | OUTPATIENT
Start: 2024-03-30 | End: 2024-04-08 | Stop reason: HOSPADM

## 2024-03-30 RX ORDER — HYDROXYZINE PAMOATE 25 MG/1
25 CAPSULE ORAL 2 TIMES DAILY PRN
Status: ON HOLD | COMMUNITY
End: 2024-04-08 | Stop reason: HOSPADM

## 2024-03-30 RX ORDER — HYDROXYZINE 50 MG/1
50 TABLET, FILM COATED ORAL 3 TIMES DAILY PRN
Status: DISCONTINUED | OUTPATIENT
Start: 2024-03-30 | End: 2024-04-08 | Stop reason: HOSPADM

## 2024-03-30 RX ORDER — POLYETHYLENE GLYCOL 3350 17 G/17G
17 POWDER, FOR SOLUTION ORAL DAILY PRN
Status: DISCONTINUED | OUTPATIENT
Start: 2024-03-30 | End: 2024-04-08 | Stop reason: HOSPADM

## 2024-03-30 RX ORDER — DIPHENHYDRAMINE HYDROCHLORIDE 50 MG/ML
50 INJECTION INTRAMUSCULAR; INTRAVENOUS EVERY 4 HOURS PRN
Status: DISCONTINUED | OUTPATIENT
Start: 2024-03-30 | End: 2024-04-08 | Stop reason: HOSPADM

## 2024-03-30 RX ORDER — SENNOSIDES A AND B 8.6 MG/1
1 TABLET, FILM COATED ORAL DAILY PRN
Status: DISCONTINUED | OUTPATIENT
Start: 2024-03-30 | End: 2024-04-08 | Stop reason: HOSPADM

## 2024-03-30 RX ORDER — HALOPERIDOL 5 MG/ML
5 INJECTION INTRAMUSCULAR EVERY 4 HOURS PRN
Status: DISCONTINUED | OUTPATIENT
Start: 2024-03-30 | End: 2024-04-08 | Stop reason: HOSPADM

## 2024-03-30 RX ORDER — MAGNESIUM HYDROXIDE/ALUMINUM HYDROXICE/SIMETHICONE 120; 1200; 1200 MG/30ML; MG/30ML; MG/30ML
30 SUSPENSION ORAL EVERY 6 HOURS PRN
Status: DISCONTINUED | OUTPATIENT
Start: 2024-03-30 | End: 2024-04-08 | Stop reason: HOSPADM

## 2024-03-30 RX ADMIN — IBUPROFEN 400 MG: 400 TABLET, FILM COATED ORAL at 10:10

## 2024-03-30 RX ADMIN — ACETAMINOPHEN 650 MG: 325 TABLET ORAL at 20:33

## 2024-03-30 ASSESSMENT — PAIN DESCRIPTION - DESCRIPTORS
DESCRIPTORS: ACHING;CRAMPING
DESCRIPTORS: ACHING;DISCOMFORT;SORE

## 2024-03-30 ASSESSMENT — PAIN DESCRIPTION - LOCATION
LOCATION: ABDOMEN
LOCATION: GENERALIZED

## 2024-03-30 ASSESSMENT — SLEEP AND FATIGUE QUESTIONNAIRES
AVERAGE NUMBER OF SLEEP HOURS: 7
DO YOU USE A SLEEP AID: NO
DO YOU HAVE DIFFICULTY SLEEPING: NO

## 2024-03-30 ASSESSMENT — LIFESTYLE VARIABLES
HOW OFTEN DO YOU HAVE A DRINK CONTAINING ALCOHOL: 2-4 TIMES A MONTH
HOW MANY STANDARD DRINKS CONTAINING ALCOHOL DO YOU HAVE ON A TYPICAL DAY: 3 OR 4

## 2024-03-30 ASSESSMENT — PAIN DESCRIPTION - ORIENTATION: ORIENTATION: LOWER

## 2024-03-30 ASSESSMENT — PAIN - FUNCTIONAL ASSESSMENT
PAIN_FUNCTIONAL_ASSESSMENT: ACTIVITIES ARE NOT PREVENTED
PAIN_FUNCTIONAL_ASSESSMENT: NONE - DENIES PAIN
PAIN_FUNCTIONAL_ASSESSMENT: ACTIVITIES ARE NOT PREVENTED
PAIN_FUNCTIONAL_ASSESSMENT: NONE - DENIES PAIN

## 2024-03-30 ASSESSMENT — PAIN SCALES - GENERAL
PAINLEVEL_OUTOF10: 8
PAINLEVEL_OUTOF10: 6
PAINLEVEL_OUTOF10: 0
PAINLEVEL_OUTOF10: 0

## 2024-03-30 NOTE — PLAN OF CARE
Problem: Anxiety  Goal: Will report anxiety at manageable levels  Description: INTERVENTIONS:  1. Administer medication as ordered  2. Teach and rehearse alternative coping skills  3. Provide emotional support with 1:1 interaction with staff  Outcome: Progressing     0850: Called and left  East a voicemail notifying him pt is a TDO and hearing needs to be set up. Pending call back.       0934: BISHNU Tovar is seeing pt.

## 2024-03-30 NOTE — ED NOTES
D19 has been accepted by [facility] by Alex Stuart MD. Room 728, nurse report number is [phone].

## 2024-03-30 NOTE — PLAN OF CARE
Problem: Safety - Adult  Goal: Free from fall injury  Outcome: Progressing     0152: Hospitalist perfect served about H&P.

## 2024-03-30 NOTE — H&P
History and Physical    Date of Service:  3/30/2024  Primary Care Provider: Benjamin Dimas Sr., MD  Source of information: The patient and Chart review    Chief Complaint: Transferred to [facility] for behavioral/mental health issue    History of Presenting Illness:   Kaitlin rCespo is a 33 y.o. female who initially presented to outside ED for behavioral health problem.  She was escorted by [place] police after she had been throwing her furniture through her window after frustrations at work. She has a hx of depression and bipolar disorder and reportedly takes her Abilify, Zoloft and hydroxyzine as prescribed. An ECO was issued and patient was transferred to [facility] for behavioral health admit. It appears as though she was medically cleared from hospital provider for transfer 3/29/2024.    Hospitalist were consulted for medical H&P.  ED H&P and notes from 3/29/2024 were reviewed.  Patient was seen and examined, she was up interacting with her peers on the acute psychiatric unit-doing another patient's hair.  Patient was cooperative and interactive with assessment, denies any dizziness or headaches, chest pain or pressure, shortness of breath or cough.  Denies any GI complaints such as nausea, vomiting, diarrhea or constipation and has no dysuria (no hesitancy/frequency/burning or pain before/during/after urination).  She reports that she is currently on her menstrual cycle.  She did request medications for menstrual cramping, already has Tylenol ordered-will order ibuprofen.    On abilify, zoloft and hydroxyzine at home.       REVIEW OF SYSTEMS:  As mentioned above in the HPI    Past Medical History:   Diagnosis Date    Asthma     Bipolar 1 disorder ([facility])     Depression     Psychosis ([facility])     Schizophrenia ([facility])       No past surgical history on file.  Prior to Admission medications    Medication Sig Start Date End Date Taking? Authorizing Provider   hydrOXYzine HCl (ATARAX) 25 MG tablet Take 1 tablet by

## 2024-03-30 NOTE — ED NOTES
Pt ambulated out of ED with [place] [employer] to be [facility] to [facility].

## 2024-03-31 LAB
EKG ATRIAL RATE: 62 BPM
EKG DIAGNOSIS: NORMAL
EKG P AXIS: 63 DEGREES
EKG P-R INTERVAL: 156 MS
EKG Q-T INTERVAL: 410 MS
EKG QRS DURATION: 82 MS
EKG QTC CALCULATION (BAZETT): 416 MS
EKG R AXIS: 60 DEGREES
EKG T AXIS: 43 DEGREES
EKG VENTRICULAR RATE: 62 BPM

## 2024-03-31 PROCEDURE — 6370000000 HC RX 637 (ALT 250 FOR IP): Performed by: NURSE PRACTITIONER

## 2024-03-31 PROCEDURE — 93010 ELECTROCARDIOGRAM REPORT: CPT | Performed by: SPECIALIST

## 2024-03-31 PROCEDURE — 93005 ELECTROCARDIOGRAM TRACING: CPT | Performed by: NURSE PRACTITIONER

## 2024-03-31 PROCEDURE — 1240000000 HC EMOTIONAL WELLNESS R&B

## 2024-03-31 RX ORDER — CLONIDINE HYDROCHLORIDE 0.1 MG/1
0.1 TABLET ORAL EVERY 6 HOURS PRN
Status: DISCONTINUED | OUTPATIENT
Start: 2024-03-31 | End: 2024-04-08 | Stop reason: HOSPADM

## 2024-03-31 RX ADMIN — IBUPROFEN 400 MG: 400 TABLET, FILM COATED ORAL at 08:46

## 2024-03-31 RX ADMIN — CLONIDINE HYDROCHLORIDE 0.1 MG: 0.1 TABLET ORAL at 21:08

## 2024-03-31 RX ADMIN — IBUPROFEN 400 MG: 400 TABLET, FILM COATED ORAL at 21:10

## 2024-03-31 RX ADMIN — CLONIDINE HYDROCHLORIDE 0.1 MG: 0.1 TABLET ORAL at 11:23

## 2024-03-31 ASSESSMENT — PAIN SCALES - GENERAL
PAINLEVEL_OUTOF10: 0
PAINLEVEL_OUTOF10: 0
PAINLEVEL_OUTOF10: 4
PAINLEVEL_OUTOF10: 0
PAINLEVEL_OUTOF10: 6

## 2024-03-31 ASSESSMENT — PAIN - FUNCTIONAL ASSESSMENT
PAIN_FUNCTIONAL_ASSESSMENT: ACTIVITIES ARE NOT PREVENTED
PAIN_FUNCTIONAL_ASSESSMENT: NONE - DENIES PAIN

## 2024-03-31 ASSESSMENT — PAIN DESCRIPTION - LOCATION
LOCATION: TEETH
LOCATION: JAW

## 2024-03-31 ASSESSMENT — PAIN DESCRIPTION - DESCRIPTORS
DESCRIPTORS: ACHING
DESCRIPTORS: ACHING

## 2024-03-31 ASSESSMENT — PAIN DESCRIPTION - ORIENTATION
ORIENTATION: OTHER (COMMENT)
ORIENTATION: LEFT;LOWER

## 2024-03-31 ASSESSMENT — PAIN SCALES - WONG BAKER: WONGBAKER_NUMERICALRESPONSE: NO HURT

## 2024-03-31 NOTE — PLAN OF CARE
Problem: Safety - Adult  Goal: Free from fall injury  Outcome: Progressing     Problem: Anxiety  Goal: Will report anxiety at manageable levels  Description: INTERVENTIONS:  1. Administer medication as ordered  2. Teach and rehearse alternative coping skills  3. Provide emotional support with 1:1 interaction with staff  Outcome: Progressing  Note: Pt is alert visible on the unit. Currently calm and cooperative.   0846- pt requests motrin for left lower tooth pain chronic. Education provided. Medication administered. Pt reports improved comfort.    1123- elevated BP. Pt denies history of HTN; c/o anxiety. Remains paranoid about medication. Education provided; Catapres 0.1 mg administered.   Pt states she is fasting today; declines lunch or snack.  Tolerates oral fluid well.     Pt is tearful during the afternoon. Focus is housing and her son. Continues to demonstrated poor insight; states Abilify and Zoloft are PRN medications. No evidence of learning with education provided.

## 2024-04-01 PROCEDURE — 1240000000 HC EMOTIONAL WELLNESS R&B

## 2024-04-01 PROCEDURE — 6370000000 HC RX 637 (ALT 250 FOR IP): Performed by: NURSE PRACTITIONER

## 2024-04-01 RX ORDER — ARIPIPRAZOLE 10 MG/1
5 TABLET ORAL
Status: DISCONTINUED | OUTPATIENT
Start: 2024-04-01 | End: 2024-04-04

## 2024-04-01 RX ADMIN — IBUPROFEN 400 MG: 400 TABLET, FILM COATED ORAL at 18:54

## 2024-04-01 NOTE — CARE COORDINATION
04/01/24 1544   ITP   Date of Plan 04/01/24   Date of Next Review 04/08/24   Primary Diagnosis Code Bipolar d/o   Barriers to Treatment Need for psychoeducation;Psychiatric symptom (comment)   Strengths Incorporated in Plan Acknowledging need for assistance   Plan of Care   Long Term Goal (LTG) Stated in patient/guardian terms Participate in out pt tx 12 wks   Short Term Goal 1   Short Term Goal 1 Client will be oriented to program and staff, and participate in assessment process   Baseline Functioning able to verbalize needs   Target 1 week   Objectives Client will participate in group therapy   Intervention 1 Acknowledge client strengths   Frequency daily   Measured by Staff observation;Self report;Behavioral data   Staff Responsible Clinical staff   Intervention 2 Group therapy   Frequency daily   Measured by Staff observation;Self report   Staff Responsible Clinical staff   Intervention 3 Monitor medications   Frequency daily   Measured by Staff observation;Behavioral data   Staff Responsible Clinical staff   STG Goal 1 Status: Patient Appears to be  Progressing toward treatment plan goal   Crisis/Safety/Discharge Plan   Crisis/Safety Plan Standard program interventions and protocol   Comprehensive Assessment Completion Date 04/01/24   Discharge Plan Pt to discharge home with follow up care

## 2024-04-01 NOTE — PLAN OF CARE
PATIENT RECEIVED RESTING IN BED. NO DISTRESS NOTED. SAFETY MEASURES IN PLACE. WILL CONTINUE TO MONITOR WITH Q15 MINUTE CHECKS.  Problem: Safety - Adult  Goal: Free from fall injury  Outcome: Progressing

## 2024-04-01 NOTE — INTERDISCIPLINARY ROUNDS
Behavioral Health Interdisciplinary Rounds     Patient Name: Kaitlin Crespo  Age: 33 y.o.  Room/Bed:  0/  Primary Diagnosis: Bipolar disorder (HCC)   Admission Status: Voluntary Commitment    Readmission within 30 days: No  Power of  in place: No  Patient requires a blocked bed: No          Reason for blocked bed:  Sleep hours: 5      Participation in Care/Groups:  Yes  Medication Compliant?: Yes  PRNS (last 24 hours):  blood pressure  Restraints (last 24 hours):  No  __________________________________________________  OQ Admission Analysis Survey completed:yes   OQ Admission Analysis Survey score:23   __________________________________________________     Alcohol screening (AUDIT) completed -  yes    Score : 3  Tobacco - patient is a smoker:    Illegal Drugs use:  negative     24 hour chart check complete: Yes    _______________________________________________    Patient goal(s) for today: plan to meet with treatment team   Treatment team focus/goals: Plan to assess for medications and discharge needs.   Progress note: She was voluntarily committed to [facility] this am, she has been loud and irritable when talking about her hearing ,homeless for 14 months - moved in on March 1 - working part time and going to school for CNA -         Financial concerns/prescription coverage:  medicaid   Family contact:                        Family requesting physician contact today:    Discharge plan: She will return home   Access to weapons : no                                                             Outpatient provider(s): [place] [id number]       LOS:  2  Expected LOS:   TBD     Participating treatment team members: Kaitlin Crespo, DEION Azul- Dr. Carolina Shelley RN

## 2024-04-01 NOTE — PLAN OF CARE
Problem: Risk for Elopement  Goal: Patient will not exit the unit/facility without proper excort  Outcome: Progressing     Problem: Anxiety  Goal: Will report anxiety at manageable levels  Description: INTERVENTIONS:  1. Administer medication as ordered  2. Teach and rehearse alternative coping skills  3. Provide emotional support with 1:1 interaction with staff  Outcome: Progressing     Problem: Safety - Adult  Goal: Free from fall injury  Outcome: Adequate for Discharge     Problem: Pain  Goal: Verbalizes/displays adequate comfort level or baseline comfort level  Outcome: Adequate for Discharge

## 2024-04-01 NOTE — PLAN OF CARE
Problem: Safety - Adult  Goal: Free from fall injury  3/31/2024 2355 by Floresita Staples RN  Outcome: Progressing     Problem: Anxiety  Goal: Will report anxiety at manageable levels  Description: INTERVENTIONS:  1. Administer medication as ordered  2. Teach and rehearse alternative coping skills  3. Provide emotional support with 1:1 interaction with staff  Outcome: Progressing     Problem: Pain  Goal: Verbalizes/displays adequate comfort level or baseline comfort level  Outcome: Progressing    Patient is interactive with peers, offering advice and direction.

## 2024-04-02 PROCEDURE — 6370000000 HC RX 637 (ALT 250 FOR IP): Performed by: PSYCHIATRY & NEUROLOGY

## 2024-04-02 PROCEDURE — 6370000000 HC RX 637 (ALT 250 FOR IP): Performed by: NURSE PRACTITIONER

## 2024-04-02 PROCEDURE — 1240000000 HC EMOTIONAL WELLNESS R&B

## 2024-04-02 RX ADMIN — ARIPIPRAZOLE 5 MG: 10 TABLET ORAL at 20:26

## 2024-04-02 RX ADMIN — CLONIDINE HYDROCHLORIDE 0.1 MG: 0.1 TABLET ORAL at 20:40

## 2024-04-02 ASSESSMENT — PAIN - FUNCTIONAL ASSESSMENT: PAIN_FUNCTIONAL_ASSESSMENT: NONE - DENIES PAIN

## 2024-04-02 NOTE — PLAN OF CARE
Problem: Anxiety  Goal: Will report anxiety at manageable levels  Description: INTERVENTIONS:  1. Administer medication as ordered  2. Teach and rehearse alternative coping skills  3. Provide emotional support with 1:1 interaction with staff  Outcome: Progressing     Problem: [place]  Goal: Will exhibit normal sleep and speech and no impulsivity  Description: INTERVENTIONS:  1. Administer medication as ordered  2. Set limits on impulsive behavior  3. Make attempts to decrease external stimuli as possible  Outcome: Progressing     Problem: Involuntary Admit  Goal: Will cooperate with staff recommendations and doctor's orders and will demonstrate appropriate behavior  Description: INTERVENTIONS:  1. Treat underlying conditions and offer medication as ordered  2. Educate regarding involuntary admission procedures and rules  3. Contain excessive/inappropriate behavior per unit and hospital policies  Outcome: Not Progressing     Patient mood is labile, expresses hostility, irritability. Patient is not receptive to recommendations of treatment team. Patient denies si/hi/a/v/h. Reports to having behaviors and being angry, not psychosis.

## 2024-04-02 NOTE — PLAN OF CARE
Problem: Involuntary Admit  Goal: Will cooperate with staff recommendations and doctor's orders and will demonstrate appropriate behavior  Description: INTERVENTIONS:  1. Treat underlying conditions and offer medication as ordered  2. Educate regarding involuntary admission procedures and rules  3. Contain excessive/inappropriate behavior per unit and hospital policies  4/2/2024 1636 by Desirae Sanders, RN  Outcome: Progressing  4/2/2024 1027 by Maddie Shelley, RN  Outcome: Not Progressing

## 2024-04-02 NOTE — PLAN OF CARE
Problem: Safety - Adult  Goal: Free from fall injury  4/1/2024 6296 by Anahy Partida, RN  Outcome: Progressing  4/1/2024 1625 by Erin Guillen, RN  Outcome: Adequate for Discharge   Patient resting in bed with eyes closed. NAD and no complaints noted. Safety measures in place. Will continue to monitor with q15min rounds.

## 2024-04-02 NOTE — PLAN OF CARE
Problem: Involuntary Admit  Goal: Will cooperate with staff recommendations and doctor's orders and will demonstrate appropriate behavior  Description: INTERVENTIONS:  1. Treat underlying conditions and offer medication as ordered  2. Educate regarding involuntary admission procedures and rules  3. Contain excessive/inappropriate behavior per unit and hospital policies  4/2/2024 1027 by Maddie Shelley, RN  Outcome: Not Progressing

## 2024-04-03 PROCEDURE — 6370000000 HC RX 637 (ALT 250 FOR IP): Performed by: NURSE PRACTITIONER

## 2024-04-03 PROCEDURE — 6370000000 HC RX 637 (ALT 250 FOR IP): Performed by: PSYCHIATRY & NEUROLOGY

## 2024-04-03 PROCEDURE — 1240000000 HC EMOTIONAL WELLNESS R&B

## 2024-04-03 RX ADMIN — CLONIDINE HYDROCHLORIDE 0.1 MG: 0.1 TABLET ORAL at 10:02

## 2024-04-03 RX ADMIN — ARIPIPRAZOLE 5 MG: 10 TABLET ORAL at 20:43

## 2024-04-03 ASSESSMENT — PAIN - FUNCTIONAL ASSESSMENT: PAIN_FUNCTIONAL_ASSESSMENT: NONE - DENIES PAIN

## 2024-04-03 NOTE — INTERDISCIPLINARY ROUNDS
Behavioral Health Interdisciplinary Rounds     Patient Name: Kaitlin Crespo  Age: 33 y.o.  Room/Bed:  730/02  Primary Diagnosis: Bipolar disorder (HCC)   Admission Status: Involuntary Commitment    Readmission within 30 days: No  Power of  in place: No  Patient requires a blocked bed: No          Reason for blocked bed:   Sleep hours:  7       Participation in Care/Groups:    Medication Compliant?: Selective  PRNS (last 24 hours):  High Blood Pressure    Restraints (last 24 hours):  No  ___________________________________________  24 hour chart check complete: Yes    _______________________________________________    Patient goal(s) for today: meet with treatment team   Treatment team focus/goals: Plan to titrate her medications   Progress note: She took Abilify last night , but had concerns it was making her nauseated , still resistant to taking medications, reports she has called her  about appealing her commitment      Spiritual Care Consult: no  Financial concerns/prescription coverage:  medicaid   Family contact:  she did not sign a AVNI                         Discharge plan: she will return home   Access to weapons : no                                                              Outpatient provider(s): [facility]       LOS:  4  Expected LOS: TBD     Participating treatment team members: Kaitlin Crespo, SALMA [address] , [facility] RN

## 2024-04-03 NOTE — PLAN OF CARE
Resting quietly in bed, breathing even and in NAD. No concerns noted at this time.    Problem: Pain  Goal: Verbalizes/displays adequate comfort level or baseline comfort level  4/3/2024 0031 by Jammie Meehan RN  Outcome: Progressing     Problem: Morenita  Goal: Will exhibit normal sleep and speech and no impulsivity  Description: INTERVENTIONS:  1. Administer medication as ordered  2. Set limits on impulsive behavior  3. Make attempts to decrease external stimuli as possible  4/3/2024 0031 by Jammie Meehan RN  Outcome: Progressing

## 2024-04-03 NOTE — DISCHARGE INSTRUCTIONS
DISCHARGE SUMMARY    NAME:Kaitlin Crespo  : 1990  MRN: [id number]    The patient Kaitlin Crespo exhibits the ability to control behavior in a less restrictive environment.  Patient's level of functioning is improving.  No assaultive/destructive behavior has been observed for the past 24 hours.  No suicidal/homicidal threat or behavior has been observed for the past 24 hours.  There is no evidence of serious medication side effects.  Patient has not been in physical or protective restraints for at least the past 24 hours.    If weapons involved, how are they secured? No weapons involved     Is patient aware of and in agreement with discharge plan? She is aware of discharge and is in agreement     Arrangements for medication:  Prescriptions     Copy of discharge instructions to provider?:  yes, [place] [id number]     Arrangements for transportation home:  nati    Keep all follow up appointments as scheduled, continue to take prescribed medications per physician instructions.  Mental health crisis number:  911 or your local mental health crisis line number at [phone]      Mental Health Emergency WARM LINE      [phone] ([phone])      M-F: 9am to 9pm      Sat & Sun: 5pm - 9pm  National suicide prevention lines:                             3-287-HCORUMT (1-767-080-0534)       1-237-412-TALK ([phone])    Crisis Text Line:  Text HOME to 502633

## 2024-04-03 NOTE — PLAN OF CARE
Pt is resting quieting this am.  Kaitlin is calm, cooperative, appears to be having some difficulty at time w/ completing a thought, She's been isolative, states she's feels \"ok\".  Med/Meal compliant, denies S/I, H/I, A/H V/H.      Problem: Safety - Adult  Goal: Free from fall injury  Outcome: Progressing     Problem: Anxiety  Goal: Will report anxiety at manageable levels  Description: INTERVENTIONS:  1. Administer medication as ordered  2. Teach and rehearse alternative coping skills  3. Provide emotional support with 1:1 interaction with staff  Outcome: Progressing     Problem: Pain  Goal: Verbalizes/displays adequate comfort level or baseline comfort level  4/3/2024 0031 by Jammie Meehan RN  Outcome: Progressing     Problem: Morenita  Goal: Will exhibit normal sleep and speech and no impulsivity  Description: INTERVENTIONS:  1. Administer medication as ordered  2. Set limits on impulsive behavior  3. Make attempts to decrease external stimuli as possible  4/3/2024 0031 by Jammie Meehan RN  Outcome: Progressing

## 2024-04-03 NOTE — PLAN OF CARE
Problem: Safety - Adult  Goal: Free from fall injury  4/3/2024 1617 by Jonelle Cerna LPN  Outcome: Progressing  Will continue safety checks as per policy   Note no voiced complaints or acute distress at present  Appears guarded at times.

## 2024-04-04 PROCEDURE — 6370000000 HC RX 637 (ALT 250 FOR IP): Performed by: NURSE PRACTITIONER

## 2024-04-04 PROCEDURE — 1240000000 HC EMOTIONAL WELLNESS R&B

## 2024-04-04 PROCEDURE — 6370000000 HC RX 637 (ALT 250 FOR IP): Performed by: PSYCHIATRY & NEUROLOGY

## 2024-04-04 RX ORDER — ACETAMINOPHEN 500 MG
1000 TABLET ORAL EVERY 6 HOURS PRN
Status: DISCONTINUED | OUTPATIENT
Start: 2024-04-04 | End: 2024-04-08 | Stop reason: HOSPADM

## 2024-04-04 RX ORDER — IBUPROFEN 600 MG/1
600 TABLET ORAL EVERY 6 HOURS PRN
Status: DISCONTINUED | OUTPATIENT
Start: 2024-04-04 | End: 2024-04-08 | Stop reason: HOSPADM

## 2024-04-04 RX ADMIN — CARIPRAZINE 1.5 MG: 1.5 CAPSULE, GELATIN COATED ORAL at 14:04

## 2024-04-04 RX ADMIN — ACETAMINOPHEN 1000 MG: 500 TABLET ORAL at 17:51

## 2024-04-04 RX ADMIN — IBUPROFEN 400 MG: 400 TABLET, FILM COATED ORAL at 08:02

## 2024-04-04 ASSESSMENT — PAIN SCALES - WONG BAKER: WONGBAKER_NUMERICALRESPONSE: NO HURT

## 2024-04-04 ASSESSMENT — PAIN SCALES - GENERAL
PAINLEVEL_OUTOF10: 0
PAINLEVEL_OUTOF10: 6
PAINLEVEL_OUTOF10: 0

## 2024-04-04 ASSESSMENT — PAIN DESCRIPTION - ONSET: ONSET: SUDDEN

## 2024-04-04 ASSESSMENT — PAIN DESCRIPTION - LOCATION: LOCATION: TEETH

## 2024-04-04 ASSESSMENT — PAIN DESCRIPTION - DESCRIPTORS: DESCRIPTORS: ACHING

## 2024-04-04 ASSESSMENT — PAIN DESCRIPTION - ORIENTATION: ORIENTATION: LEFT;LOWER

## 2024-04-04 NOTE — INTERDISCIPLINARY ROUNDS
Behavioral Health Interdisciplinary Rounds     Patient Name: Kaitlin Crespo  Age: 33 y.o.  Room/Bed:  730/02  Primary Diagnosis: Bipolar disorder (HCC)   Admission Status: Involuntary Commitment    Readmission within 30 days: No  Power of  in place: No  Patient requires a blocked bed: No          Reason for blocked bed:   Sleep hours: 7      Participation in Care/Groups:  Yes  Medication Compliant?: Yes  PRNS (last 24 hours): Antihypertensive    Restraints (last 24 hours):  No  ___________________________________________  24 hour chart check complete: Yes    _______________________________________________    Patient goal(s) for today: meet with treatment team   Treatment team focus/goals: Plan to switch medications and transfer to general   Progress note: Still not wanting to take medications for her dx , but agreed to take VRAYLAR      Spiritual Care Consult: no  Financial concerns/prescription coverage:  medicaid   Family contact: she did not sign a AVNI                        Family requesting physician contact today:    Discharge plan: she will return to her apartment   Access to weapons : no                                                              Outpatient provider(s): [address] [phone] - [facility]- [email or web address]  Phone: [phone], Fax: [phone]  [place] [address]      LOS:  5  Expected LOS: TBD    Participating treatment team members: Kaitlin Crespo, DEION Azul Dr., RN

## 2024-04-04 NOTE — PLAN OF CARE
Problem: Anxiety  Goal: Will report anxiety at manageable levels  Description: INTERVENTIONS:  1. Administer medication as ordered  2. Teach and rehearse alternative coping skills  3. Provide emotional support with 1:1 interaction with staff  4/4/2024 1633 by Desirae Sanders, RN  Outcome: Progressing  Flowsheets (Taken 4/4/2024 1632)  Will report anxiety at manageable levels: Administer medication as ordered  4/4/2024 0853 by Maddie Shelley, RN  Outcome: Progressing  Flowsheets (Taken 4/4/2024 0736)  Will report anxiety at manageable levels: Administer medication as ordered      O-Z Flap Text: An advancement flap was designed O-Z. Local anesthesia was obtained. The defect edges were debeveled with a #15 scalpel blade. The flap was incised to the underlying adipose tissue. The flap was then undermined and elevated. The tissue surrounding the operative site was undermined extensively with blunt scissor dissection. Hemostasis was obtained using electrocoagulation. The flap was then advanced into the primary defect. Raised tissue cones were removed as necessary by standard technique. The superficial fascia and subcutaneous layers were closed with buried vertical mattress sutures. The epidermis was then approximated. Careful attention was given to eversion and even approximation of the wound edges. A pressure dressing was applied.

## 2024-04-04 NOTE — PLAN OF CARE
Problem: Safety - Adult  Goal: Free from fall injury  4/3/2024 2342 by Danny Stuart, RN  Outcome: Progressing  Flowsheets (Taken 4/3/2024 2342)  Free From Fall Injury: Instruct family/caregiver on patient safety     Problem: Anxiety  Goal: Will report anxiety at manageable levels  Description: INTERVENTIONS:  1. Administer medication as ordered  2. Teach and rehearse alternative coping skills  3. Provide emotional support with 1:1 interaction with staff  Outcome: Progressing

## 2024-04-04 NOTE — PLAN OF CARE
Problem: Anxiety  Goal: Will report anxiety at manageable levels  Description: INTERVENTIONS:  1. Administer medication as ordered  2. Teach and rehearse alternative coping skills  3. Provide emotional support with 1:1 interaction with staff  4/4/2024 0853 by Maddie Shelley RN  Outcome: Progressing  Flowsheets (Taken 4/4/2024 0736)  Will report anxiety at manageable levels: Administer medication as ordered  4/3/2024 2342 by Danny Stuart, RN  Outcome: Progressing  Flowsheets  Taken 4/3/2024 2342 by Danny Stuart RN  Will report anxiety at manageable levels:   Administer medication as ordered   Provide emotional support with 1:1 interaction with staff   Teach and rehearse alternative coping skills  Taken 4/3/2024 1530 by Jonelle Cerna LPN  Will report anxiety at manageable levels: Teach and rehearse alternative coping skills     Problem: Involuntary Admit  Goal: Will cooperate with staff recommendations and doctor's orders and will demonstrate appropriate behavior  Description: INTERVENTIONS:  1. Treat underlying conditions and offer medication as ordered  2. Educate regarding involuntary admission procedures and rules  3. Contain excessive/inappropriate behavior per unit and hospital policies  Outcome: Progressing     Problem: Morenita  Goal: Will exhibit normal sleep and speech and no impulsivity  Description: INTERVENTIONS:  1. Administer medication as ordered  2. Set limits on impulsive behavior  3. Make attempts to decrease external stimuli as possible  Outcome: Progressing     Patient is calm and cooperative Medication compliant. Denies si/hi/a/v/h.  Engages with peers, offering education points on their stay, her stay, the legalities that she perceives. Future focused on discharging, returning to school.

## 2024-04-05 LAB
CHOLEST SERPL-MCNC: 94 MG/DL
EST. AVERAGE GLUCOSE BLD GHB EST-MCNC: 108 MG/DL
HBA1C MFR BLD: 5.4 % (ref 4–5.6)
HDLC SERPL-MCNC: 65 MG/DL
HDLC SERPL: 1.4 (ref 0–5)
LDLC SERPL CALC-MCNC: 26 MG/DL (ref 0–100)
TRIGL SERPL-MCNC: 15 MG/DL
VLDLC SERPL CALC-MCNC: 3 MG/DL

## 2024-04-05 PROCEDURE — 6370000000 HC RX 637 (ALT 250 FOR IP): Performed by: NURSE PRACTITIONER

## 2024-04-05 PROCEDURE — 83036 HEMOGLOBIN GLYCOSYLATED A1C: CPT

## 2024-04-05 PROCEDURE — 6370000000 HC RX 637 (ALT 250 FOR IP): Performed by: PSYCHIATRY & NEUROLOGY

## 2024-04-05 PROCEDURE — 36415 COLL VENOUS BLD VENIPUNCTURE: CPT

## 2024-04-05 PROCEDURE — 1240000000 HC EMOTIONAL WELLNESS R&B

## 2024-04-05 PROCEDURE — 80061 LIPID PANEL: CPT

## 2024-04-05 RX ADMIN — CLONIDINE HYDROCHLORIDE 0.1 MG: 0.1 TABLET ORAL at 08:05

## 2024-04-05 RX ADMIN — CARIPRAZINE 1.5 MG: 1.5 CAPSULE, GELATIN COATED ORAL at 08:05

## 2024-04-05 RX ADMIN — IBUPROFEN 600 MG: 600 TABLET, FILM COATED ORAL at 18:26

## 2024-04-05 ASSESSMENT — PAIN SCALES - GENERAL
PAINLEVEL_OUTOF10: 3
PAINLEVEL_OUTOF10: 3

## 2024-04-05 ASSESSMENT — PAIN DESCRIPTION - DESCRIPTORS: DESCRIPTORS: ACHING

## 2024-04-05 ASSESSMENT — PAIN - FUNCTIONAL ASSESSMENT
PAIN_FUNCTIONAL_ASSESSMENT: NONE - DENIES PAIN
PAIN_FUNCTIONAL_ASSESSMENT: NONE - DENIES PAIN

## 2024-04-05 ASSESSMENT — PAIN DESCRIPTION - LOCATION: LOCATION: TEETH

## 2024-04-05 NOTE — INTERDISCIPLINARY ROUNDS
Behavioral Health Interdisciplinary Rounds     Patient Name: Kaitlin Crespo  Age: 33 y.o.  Room/Bed:  0/  Primary Diagnosis: Bipolar disorder (HCC)  Admission Status: Involuntary Commitment  Readmission within 30 days: No  Power of  in place:  No  Patient requires a blocked bed: No      Reason for blocked bed:  N/A    Sleep hours: 7+  Participation in Care/Groups: Yes  Medication Compliant?: Yes  PRNS (last 24 hours):  Tylenol  Restraints (last 24 hours):   No  __________________________________________________  OQ Admission Analysis Survey completed:  OQ Admission Analysis Survey score:  __________________________________________________                Substance Abuse:   Alcohol screening (AUDIT) completed -     If applicable, date SBIRT discussed in treatment team AND documented:   Tobacco -   24 hour chart check complete: Yes  ______________________________________    Patient goal(s) for today: Communicate needs to staff   Treatment team focus/goals: Assess pt, manage medications, discharge planning   Progress note: Pt is improving. Dr Figueroa has adjusted pt medications. Will monitor over weeknd        Financial concerns/prescription coverage:    Family contact:                        Family requesting physician contact today:    Discharge plan:   Access to weapons :                                                              Outpatient provider(s):   Patient's preferred phone number for follow up call :   Patient's preferred e-mail address :  Participating treatment team members:    LOS: 6  Expected LOS: 4/8/24    Participating treatment team members: Kaitlin Epifanioelder Crespo, FERNANDO Torres, Maricel CASTANEDA RN, Dr. Figueroa

## 2024-04-05 NOTE — PLAN OF CARE
Problem: Anxiety  Goal: Will report anxiety at manageable levels  Description: INTERVENTIONS:  1. Administer medication as ordered  2. Teach and rehearse alternative coping skills  3. Provide emotional support with 1:1 interaction with staff  Outcome: Progressing

## 2024-04-05 NOTE — PLAN OF CARE
Problem: ABCDS Injury Assessment  Goal: Absence of physical injury  Outcome: Progressing  Flowsheets (Taken 4/5/2024 0039)  Absence of Physical Injury: Implement safety measures based on patient assessment  Note: Pt received resting quietly in bed with eyes closed. No signs of respiratory distress. Safety measures in place. Will continue to monitor q15m rounds.

## 2024-04-06 ENCOUNTER — APPOINTMENT (OUTPATIENT)
Facility: HOSPITAL | Age: 34
DRG: 885 | End: 2024-04-06
Attending: PSYCHIATRY & NEUROLOGY
Payer: MEDICARE

## 2024-04-06 PROCEDURE — 6370000000 HC RX 637 (ALT 250 FOR IP): Performed by: PSYCHIATRY & NEUROLOGY

## 2024-04-06 PROCEDURE — 74022 RADEX COMPL AQT ABD SERIES: CPT

## 2024-04-06 PROCEDURE — 76700 US EXAM ABDOM COMPLETE: CPT

## 2024-04-06 PROCEDURE — 1240000000 HC EMOTIONAL WELLNESS R&B

## 2024-04-06 RX ADMIN — ACETAMINOPHEN 1000 MG: 500 TABLET ORAL at 19:25

## 2024-04-06 RX ADMIN — CARIPRAZINE 3 MG: 1.5 CAPSULE, GELATIN COATED ORAL at 09:15

## 2024-04-06 ASSESSMENT — PAIN DESCRIPTION - DESCRIPTORS: DESCRIPTORS: DISCOMFORT

## 2024-04-06 ASSESSMENT — PAIN DESCRIPTION - LOCATION: LOCATION: GENERALIZED

## 2024-04-06 ASSESSMENT — PAIN - FUNCTIONAL ASSESSMENT: PAIN_FUNCTIONAL_ASSESSMENT: ACTIVITIES ARE NOT PREVENTED

## 2024-04-06 ASSESSMENT — PAIN SCALES - GENERAL: PAINLEVEL_OUTOF10: 10

## 2024-04-06 NOTE — PLAN OF CARE
1409- Dr Figueroa placed hospitalist consult for  AAF w/bp d/o experiencing RLQ sharp pain 8/10, is having BMs, no change in appetite.   Perfect serve sent; wait for reply.   New orders placed by hospitalist. Pt is NPO; eduction provided. Pt verbalizes understanding of NPO. MD will see pt after imaging has resulted.   Pt leave unit for US and Xray.   1925- pt requests tylenol for 10/10 generalized discomfort; medication administered. Pt is visible on the unit talking to staff and peers.     Problem: Safety - Adult  Goal: Free from fall injury  Outcome: Progressing     Problem: Anxiety  Goal: Will report anxiety at manageable levels  Description: INTERVENTIONS:  1. Administer medication as ordered  2. Teach and rehearse alternative coping skills  3. Provide emotional support with 1:1 interaction with staff  Outcome: Progressing     Problem: Morenita  Goal: Will exhibit normal sleep and speech and no impulsivity  Description: INTERVENTIONS:  1. Administer medication as ordered  2. Set limits on impulsive behavior  3. Make attempts to decrease external stimuli as possible  Outcome: Progressing  Note: Pt is alert and oriented. Calm and cooperative. Bright affect when engaged. Mood and affect congruent. Medication compliant. Reports sleeping well and feeling well rested.

## 2024-04-06 NOTE — CONSULTS
\"TP\", \"ALB\", \"GLOB\", \"GGT\", \"AML\" in the last 72 hours.    Invalid input(s): \"SGOT\", \"GPT\", \"AP\", \"TBIL\", \"TBILI\", \"AMYP\", \"LPSE\", \"HLPSE\"  No results for input(s): \"INR\", \"APTT\" in the last 72 hours.    Invalid input(s): \"PTP\"   No results for input(s): \"TIBC\", \"FERR\" in the last 72 hours.    Invalid input(s): \"FE\", \"PSAT\"   No results found for: \"FOL\", \"RBCF\"   No results for input(s): \"PH\", \"PCO2\", \"PO2\" in the last 72 hours.  No results for input(s): \"CPK\" in the last 72 hours.    Invalid input(s): \"CPKMB\", \"CKNDX\", \"TROIQ\"  Lab Results   Component Value Date/Time    CHOL 94 04/05/2024 06:17 AM    HDL 65 04/05/2024 06:17 AM     No results found for: \"GLUCPOC\"  [unfilled]    Notes reviewed from all clinical/nonclinical/nursing services involved in patient's clinical care. Care coordination discussions were held with appropriate clinical/nonclinical/ nursing providers based on care coordination needs.         Patients current active Medications were reviewed, considered, added and adjusted based on the clinical condition today.      Home Medications were reconciled to the best of my ability given all available resources at the time of admission. Route is PO if not otherwise noted.      Admission Status:[id number]:::1}      Medications Reviewed:     Current Facility-Administered Medications   Medication Dose Route Frequency    cariprazine hcl (VRAYLAR) capsule 3 mg  3 mg Oral Daily    acetaminophen (TYLENOL) tablet 1,000 mg  1,000 mg Oral Q6H PRN    ibuprofen (ADVIL;MOTRIN) tablet 600 mg  600 mg Oral Q6H PRN    benzocaine (ORAJEL) 20 % mucosal gel   Mouth/Throat BID PRN    cloNIDine (CATAPRES) tablet 0.1 mg  0.1 mg Oral Q6H PRN    polyethylene glycol (GLYCOLAX) packet 17 g  17 g Oral Daily PRN    senna (SENOKOT) tablet 8.6 mg  1 tablet Oral Daily PRN    aluminum & magnesium hydroxide-simethicone (MAALOX) 200-200-20 MG/5ML suspension 30 mL  30 mL Oral Q6H PRN    hydrOXYzine HCl (ATARAX) tablet 50 mg  50 mg Oral TID PRN

## 2024-04-06 NOTE — PLAN OF CARE
Kaitlin is resting quietly in bed, resting quietly and in NAD. Safety measures maintained, no concerns noted at this time.     Problem: Pain  Goal: Verbalizes/displays adequate comfort level or baseline comfort level  Outcome: Progressing     Problem: Psychosis  Goal: Will report no hallucinations or delusions  Description: INTERVENTIONS:  1. Administer medication as  ordered  2. Assist with reality testing to support increasing orientation  3. Assess if patient's hallucinations or delusions are encouraging self harm or harm to others and intervene as appropriate  Outcome: Progressing

## 2024-04-07 ENCOUNTER — APPOINTMENT (OUTPATIENT)
Facility: HOSPITAL | Age: 34
DRG: 885 | End: 2024-04-07
Attending: PSYCHIATRY & NEUROLOGY
Payer: MEDICARE

## 2024-04-07 LAB
APPEARANCE UR: CLEAR
BACTERIA URNS QL MICRO: NEGATIVE /HPF
BILIRUB UR QL: NEGATIVE
COLOR UR: NORMAL
EPITH CASTS URNS QL MICRO: NORMAL /LPF
GLUCOSE UR STRIP.AUTO-MCNC: NEGATIVE MG/DL
HGB UR QL STRIP: NEGATIVE
HYALINE CASTS URNS QL MICRO: NORMAL /LPF (ref 0–5)
KETONES UR QL STRIP.AUTO: NEGATIVE MG/DL
LEUKOCYTE ESTERASE UR QL STRIP.AUTO: NEGATIVE
NITRITE UR QL STRIP.AUTO: NEGATIVE
PH UR STRIP: 6 (ref 5–8)
PROT UR STRIP-MCNC: NEGATIVE MG/DL
RBC #/AREA URNS HPF: NORMAL /HPF (ref 0–5)
SP GR UR REFRACTOMETRY: 1.01 (ref 1–1.03)
URINE CULTURE IF INDICATED: NORMAL
UROBILINOGEN UR QL STRIP.AUTO: 0.2 EU/DL (ref 0.2–1)
WBC URNS QL MICRO: NORMAL /HPF (ref 0–4)

## 2024-04-07 PROCEDURE — 81001 URINALYSIS AUTO W/SCOPE: CPT

## 2024-04-07 PROCEDURE — 6370000000 HC RX 637 (ALT 250 FOR IP): Performed by: PSYCHIATRY & NEUROLOGY

## 2024-04-07 PROCEDURE — 73030 X-RAY EXAM OF SHOULDER: CPT

## 2024-04-07 PROCEDURE — 1240000000 HC EMOTIONAL WELLNESS R&B

## 2024-04-07 RX ADMIN — CARIPRAZINE 3 MG: 1.5 CAPSULE, GELATIN COATED ORAL at 08:43

## 2024-04-07 RX ADMIN — ACETAMINOPHEN 1000 MG: 500 TABLET ORAL at 18:40

## 2024-04-07 ASSESSMENT — PAIN SCALES - GENERAL
PAINLEVEL_OUTOF10: 7
PAINLEVEL_OUTOF10: 0

## 2024-04-07 ASSESSMENT — PAIN DESCRIPTION - LOCATION: LOCATION: TEETH

## 2024-04-07 ASSESSMENT — PAIN - FUNCTIONAL ASSESSMENT
PAIN_FUNCTIONAL_ASSESSMENT: ACTIVITIES ARE NOT PREVENTED
PAIN_FUNCTIONAL_ASSESSMENT: 0-10

## 2024-04-07 ASSESSMENT — PAIN DESCRIPTION - ORIENTATION: ORIENTATION: LEFT;LOWER

## 2024-04-07 ASSESSMENT — PAIN DESCRIPTION - DESCRIPTORS: DESCRIPTORS: ACHING

## 2024-04-07 NOTE — PLAN OF CARE
Problem: Safety - Adult  Goal: Free from fall injury  Outcome: Progressing     Problem: Anxiety  Goal: Will report anxiety at manageable levels  Description: INTERVENTIONS:  1. Administer medication as ordered  2. Teach and rehearse alternative coping skills  3. Provide emotional support with 1:1 interaction with staff  Outcome: Progressing     Problem: Pain  Goal: Verbalizes/displays adequate comfort level or baseline comfort level  Outcome: Progressing  Note: Pt is alert and oriented. Irritable. Reports poor sleep overnight due to disruptive milieu. Medication compliant with education. Pt refuses NPO and lab work. Pt tolerates leaving the unit with staff and transport for xray.   Lizabeth NP on the unit to see pt. Pt continues to refuse lab work. Denies pain. Pt is focused on discharging tomorrow.

## 2024-04-07 NOTE — BH NOTE
Behavioral Health Interdisciplinary Rounds     Patient Name: Kaitlin Crespo  Age: 33 y.o.  Room/Bed:  730/02  Primary Diagnosis: Bipolar disorder (HCC)   Admission Status: Voluntary Commitment    Readmission within 30 days: No  Power of  in place: No  Patient requires a blocked bed: No          Reason for blocked bed:   Sleep hours:        Participation in Care/Groups:  No  Medication Compliant?: Refusing Psychiatric Medications  PRNS (last 24 hours): Pain   Restraints (last 24 hours):  No  ___________________________________________  24 hour chart check complete: Yes    _______________________________________________    Patient goal(s) for today: meet with treatment team   Treatment team focus/goals: Plan to continue to encourage patient to start medications   Progress note: She come to treatment team , refused medications last night , denies SI , denies AH or VH, she reports she slept well, stated she did not want an anti-psychotic , because she is not psychotic      Spiritual Care Consult: no  Financial concerns/prescription coverage: medicaid    Family contact:  She did not sign a AVNI                       Family requesting physician contact today:    Discharge plan: She will return to her apartment   Access to weapons : no                                                              [facility] [place] [id number]       LOS:  3  Expected LOS: TBD     Participating treatment team members: Kaitlin Crespo, DEION Azul Dr., RN        
 Patient was involuntarily committed to [facility] on 4/1/24 for up to 30 days.     
4 Eyes Skin Assessment     NAME:  Kaitlin Crespo  YOB: 1990  MEDICAL RECORD NUMBER:  [id number]    The patient is being assessed for  Admission    I agree that at least one RN has performed a thorough Head to Toe Skin Assessment on the patient. ALL assessment sites listed below have been assessed.      Areas assessed by both nurses:    Head, Face, Ears, Shoulders, Back, Chest, Arms, Elbows, Hands, Sacrum. Buttock, Coccyx, Ischium, and Legs. Feet and Heels        Does the Patient have a Wound? No noted wound(s)       Jorden Prevention initiated by RN: Yes  Wound Care Orders initiated by RN: No    Pressure Injury (Stage 3,4, Unstageable, DTI, NWPT, and Complex wounds) if present, place Wound referral order by RN under : No    New Ostomies, if present place, Ostomy referral order under : No     Nurse 1 eSignature: Electronically signed by TIFFANY CISNEROS RN on 3/30/24 at 1:39 AM EDT    **SHARE this note so that the co-signing nurse can place an eSignature**    Nurse 2 eSignature: Floresita RODRÍGUEZ   
Admission reviewed for medical necessity. Will follow with care [facility].  
Admission reviewed for medical necessity. Will follow with care [facility].  
Approached patient for AM lab draw, states \"I think I want to wait on that.\" When asked, states \"I'll think about it\". Re-timed and will pass along to AM shift.  
Approached pt to obtain ordered labs r/t abdominal pain. Provided education on the importance of lab monitoring in health promotion. States \"I'm going to be honest I really don't feel like doing that right now\". Asks RN to come back \"around breakfast because I don't want to go out of my way for it\".    Re-timed and informed charge RN.  
Following treatment team, patient approached writer to begin the discharge process. Educated patient on what a Voluntary Commitment entails. She has volunteered to stay and follow treatment plan. After 72 hours she may request to leave and the treatment team has 48 hours after that to release her or have patient reassessed. Patient says, \"oh whatever\", makes phone call.  
GROUP THERAPY PROGRESS NOTE    Patient did not participate in recreational therapy group.    Katherine Gillies, MSW, [facility]    
GROUP THERAPY PROGRESS NOTE    Patient is participating in self-care group.    Group time: 30 minutes    Personal goal for participation: Develop an understanding of sleep hygiene    Goal orientation: Personal    Group therapy participation: Active      Therapeutic interventions reviewed and discussed: Group members were able to develop an understanding of how sleep patterns effect mental health. Members were guided through developing an understanding of sleep hygiene. Members gained insight through discussion about current maladaptive sleep habits and ways to improve sleep quality. Sleep hygiene guideline worksheet provided.    Impression of participation: Pt was present and engaged in group discussion. Pt added insight to group topic. Pt was calm, cooperative.      Katherine Gillies MSW, [facility]      
GROUP THERAPY PROGRESS NOTE    Unable to hold groups due to unit acuity as per Tech/Nurse. SW provided pts with various handouts to complete independently.     Katherine Gillies, MSW, [facility]  
PRN Medication Documentation    Specific patient behavior that led to need for PRN medication: Patient reported pain, and had elevated blood pressure (152/104)   Staff interventions attempted prior to PRN being given: relaxation, calming techniques   PRN medication given: ibuprofen 400 mg, clonidine 0.1 mg   Patient response/effectiveness of PRN medication: no further needs reported at this time.     
PRN Medication Documentation    Specific patient behavior that led to need for PRN medication: elevated B/P 149/88  P- 65@ 0945  Staff interventions attempted prior to PRN being given: education  PRN medication given: Clonidine 0.1mg PO given @ 1002  Patient response/effectiveness of PRN medication:  1047- B/p 127/82 P- 81  
PSYCHIATRIC PROGRESS NOTE    Legal: Voluntary commitment.    Chief Complaint:  \"I can tell the medication dissolved.\"    Length of Stay: 4 Days    Interval History:  04/03/2024  Nursing report that patient did take her medications last night. She was observed sleeping. She says that she didn't sleep.  Today her statements about her illness aren't logical. She says that she actually was just going through.  She says that she can feel her medication dissolving.  Patient denies SI or HI. No AVH.      04/02/2024  Nursing report patient slept well overnight.  She did refuse her prescribed abilify.  Kaitlin was initially argumentative, questioning how the treatment team is going to know how psychosis feels. She insists that only she knows what she goes through. She says that she put herself here because she lost control of her behaviors. We shared with her that her commitment has been changed to 'involuntary' instead of being voluntary for 5 days. She did accept this information well, and asked about the proper procedure to handle next steps.    Discussed with the patient her diagnosis and proper treatment medications, but she became subdued and not as engaged. She requested to leave the treatment team. I did suggest patient meet with our clinical pharmacist and discuss further her diagnosis, and medication options.    04/01/2024  Nursing report that patient slept 7 hours  Patient tries to explain that she was upset earlier because there was a misunderstanding.  She says that she's had a mental breakdown. She shares that she's been homeless for 14 months. She talks about going from place to place and from job to job. She feels that she was rushed into an apartment 3/1, but that she has no furniture. 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 At the same time she says that she doesn't make all the right choices. She says that she ends up in trouble many 
PSYCHIATRIC PROGRESS NOTE    Legal: Voluntary commitment.    Chief Complaint:  \"I don't know\"    Length of Stay: 5 Days    Interval History:  04/04/2024  Patient doesn't say much during her evaluation. She looks at the floor and provides yes and no answers.  Ms Crespo is withdrawn. She says that she has a tooth ache on L side. She feels that she is experiencing some nausea.  She feels that zoloft and hydroxyzine were working for her. She says that she agreed to take the abilify because she knows how to fight it.    Denies SI. Denies HI.    04/03/2024  Nursing report that patient did take her medications last night. She was observed sleeping. She says that she didn't sleep.  Today her statements about her illness aren't logical. She says that she actually was just going through.  She says that she can feel her medication dissolving.  Patient denies SI or HI. No AVH.    04/02/2024  Nursing report patient slept well overnight.  She did refuse her prescribed abilify.  Kaitlin was initially argumentative, questioning how the treatment team is going to know how psychosis feels. She insists that only she knows what she goes through. She says that she put herself here because she lost control of her behaviors. We shared with her that her commitment has been changed to 'involuntary' instead of being voluntary for 5 days. She did accept this information well, and asked about the proper procedure to handle next steps.    Discussed with the patient her diagnosis and proper treatment medications, but she became subdued and not as engaged. She requested to leave the treatment team. I did suggest patient meet with our clinical pharmacist and discuss further her diagnosis, and medication options.    04/01/2024  Nursing report that patient slept 7 hours  Patient tries to explain that she was upset earlier because there was a misunderstanding.  She says that she's had a mental breakdown. She shares that she's been homeless for 14 
PSYCHIATRIC PROGRESS NOTE    Legal: Voluntary commitment.    Chief Complaint:  \"I'm better..\"    Length of Stay: 8 Days    Interval History:  04/07/2024  Nursing report patient has been polite, cooperative. She is taking her medications and taking her medications.  She shares that she is tolerating vraylar without any side-effects.  Kaitlin had L shoulder xray and abdominal US that showed steatosis.  Denies SI or HI.    04/06/2024  Nursing report patient is adherent to medications. She slept 7 hours.   Says that she tolerated vraylar well.  Upon my evaluation she denies experiencing SI or HI.   She complains of 8/10 sharp RLQ pain that is intermittent. It started today. She is on her menstrual cycle, but says this pain feels different. She doesn't want to take medications, but will think about it.     04/05/2024  Nursing report that patient took her medication without incident. She tried to board on the general side and felt that it was overwhelming.  Upon my evaluation, she says that she tolerated vraylar well without any side-effects, but isn't sure if she is experiencing any benefit.  She feels somewhat calmer. She denies SI, HI.    04/04/2024  Patient doesn't say much during her evaluation. She looks at the floor and provides yes and no answers.  Ms Crespo is withdrawn. She says that she has a tooth ache on L side. She feels that she is experiencing some nausea.  She feels that zoloft and hydroxyzine were working for her. She says that she agreed to take the abilify because she knows how to fight it.    Denies SI. Denies HI.    04/03/2024  Nursing report that patient did take her medications last night. She was observed sleeping. She says that she didn't sleep.  Today her statements about her illness aren't logical. She says that she actually was just going through.  She says that she can feel her medication dissolving.  Patient denies SI or HI. No AVH.    04/02/2024  Nursing report patient slept well 
PSYCHIATRIC PROGRESS NOTE    Legal: Voluntary commitment.    Chief Complaint:  \"There is a misunderstanding.\"    Length of Stay: 2 Days    Interval History:  Nursing report that patient slept 7 hours  Patient tries to explain that she was upset earlier because there was a misunderstanding.  She says that she's had a mental breakdown. She shares that she's been homeless for 14 months. She talks about going from place to place and from job to job. She feels that she was rushed into an apartment 3/1, but that she has no furniture. 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 At the same time she says that she doesn't make all the right choices. She says that she ends up in trouble many times because of her emotional states. [place] [id number] helps her with counseling. [facility]. 1/24-2/1 district  stopped services, told her because she's missed 3 consecutively scheduled appointments. She spoke with her therapist 2 days ago.    Her son is with her mother.    She says that she was explained by her PCP how to take her zoloft. She's been taking it at most 4 nights in a row. She's been prescribed her abilify every day since May. She shares that she's on disability. She had been hospitalized in 2018 in [facility]. 9yo. Has 4 sibling.    She says that she was on seroquel in the past that didn't help her.    LMP  Started Thursday    Allergies:  Allergies   Allergen Reactions    Penicillins Hives    Peanut-Containing Drug Products Hives and Swelling     Past Medical History:  Past Medical History:   Diagnosis Date    Asthma     Bipolar 1 disorder (HCC)     Depression     Psychosis (HCC)     Schizophrenia (HCC)        Labs:  Lab Results   Component Value Date/Time    WBC 11.8 03/29/2024 04:22 PM    HGB 12.0 03/29/2024 04:22 PM    HCT 36.8 03/29/2024 04:22 PM     03/29/2024 04:22 PM    MCV 88.9 03/29/2024 04:22 PM      Lab 
PSYCHIATRIC PROGRESS NOTE    Legal: Voluntary commitment.    Chief Complaint:  \"There is a misunderstanding.\"    Length of Stay: 3 Days    Interval History:  04/02/2024  Nursing report patient slept well overnight.  She did refuse her prescribed abilify.  Kaitlin was initially argumentative, questioning how the treatment team is going to know how psychosis feels. She insists that only she knows what she goes through. She says that she put herself here because she lost control of her behaviors. We shared with her that her commitment has been changed to 'involuntary' instead of being voluntary for 5 days. She did accept this information well, and asked about the proper procedure to handle next steps.    Discussed with the patient her diagnosis and proper treatment medications, but she became subdued and not as engaged. She requested to leave the treatment team. I did suggest patient meet with our clinical pharmacist and discuss further her diagnosis, and medication options.    04/01/2024  Nursing report that patient slept 7 hours  Patient tries to explain that she was upset earlier because there was a misunderstanding.  She says that she's had a mental breakdown. She shares that she's been homeless for 14 months. She talks about going from place to place and from job to job. She feels that she was rushed into an apartment 3/1, but that she has no furniture. 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 At the same time she says that she doesn't make all the right choices. She says that she ends up in trouble many times because of her emotional states. [place] [id number] helps her with counseling. [facility]. 1/24-2/1 [place] [address] stopped services, told her because she's missed 3 consecutively scheduled appointments. She spoke with her therapist 2 days ago.    Her son is with her mother.    She says that she was explained by her PCP 
PSYCHIATRIC PROGRESS NOTE    Legal: Voluntary commitment.    Chief Complaint:  \"What is this place.\"    Length of Stay: 3 Days    Interval History:  3/31/24  Patient admitted through the emergency department on [facility] after a period of aggression and destruction of property at her apartment.  She reports her neighbor really triggered her and she knows better how to manage this now.  She has been prescribed medications including Abilify 5, Zoloft, and hydroxyzine for bipolar which she is struggling with the diagnosis but taking medications for the last week or so.  She reports she has periods of up and down moods but \"nothing I cannot manage with good coping skills\".  She has poor insight into her mental illness.  Denies any acute thoughts of suicide, homicide, or having audiovisual hallucinations.  Reports she did sleep pretty okay last night but was anxious.  No disruptions noted by nursing last night.  No further complaints or psychiatric symptoms reported today.  She is worried about a new job and does not want to lose it.  Her son is with her mother.      She says that she was on seroquel in the past that didn't help her.    LMP  Started Thursday    Allergies:  Allergies   Allergen Reactions    Penicillins Hives    Peanut-Containing Drug Products Hives and Swelling     Past Medical History:  Past Medical History:   Diagnosis Date    Asthma     Bipolar 1 disorder (HCC)     Depression     Psychosis (HCC)     Schizophrenia (HCC)         ARIPiprazole  5 mg Oral QHS   Labs:  Lab Results   Component Value Date/Time    WBC 11.8 03/29/2024 04:22 PM    HGB 12.0 03/29/2024 04:22 PM    HCT 36.8 03/29/2024 04:22 PM     03/29/2024 04:22 PM    MCV 88.9 03/29/2024 04:22 PM      Lab Results   Component Value Date/Time     03/29/2024 04:22 PM    K 3.7 03/29/2024 04:22 PM     03/29/2024 04:22 PM    CO2 28 03/29/2024 04:22 PM    BUN 12 03/29/2024 04:22 PM    GLOB 4.0 03/29/2024 04:22 PM    ALT 20 03/29/2024 04:22 
PSYCHOSOCIAL ASSESSMENT  :Patient identifying info:   Kaitlin Crespo is a 33 y.o., female admitted 3/30/2024 12:51 AM     Presenting problem and precipitating factors: She was admitted on a [place] [address] TDO due to aggressive and bizarre behavior.  Police were called to her new apartment because she was throwing furniture out the window, according to the police she made vague suicidal statements about overdosing on her medications - [facility] reports non compliance with her medications       [facility] - [phone]    Mental status assessment: alert, oriented x's 3 - irritable and loud, denies SI , denies AH or VH     Strengths/Recreation/Coping Skills:safe housing - established outpatient treatment     Collateral information: she did not sign a AVNI     Current psychiatric /substance abuse providers and contact info: [facility]     Previous psychiatric/substance abuse providers and response to treatment: she was hospitalized at [facility] for several months     Family history of mental illness or substance abuse: she reports no family history     Substance abuse history:    Social History     Tobacco Use    Smoking status: Former     Current packs/day: 0.25     Types: Cigarettes     Passive exposure: Never    Smokeless tobacco: Never   Substance Use Topics    Alcohol use: Not Currently       History of biomedical complications associated with substance abuse: none noted     Patient's current acceptance of treatment or motivation for change: she was admitted on a [place] [address] TDO     Family constellation: single , 7 year old son who lives with his grandmother     Is significant other involved?     Describe support system:     Describe living arrangements and home environment: she recently moved into an apartment     GUARDIAN/POA: no    Guardian Name:     Guardian Contact:     Health issues: no medical issues     Trauma history: none noted     Legal 
Patient initially refused morning labs, then came to nurses station at 0610 and stated they changed their mind. This writer prepared to take patient's labs, patient then stated they would feel more comfortable if the charge nurse obtained the labs. The charge nurse went to take the patient's labs, then the patient stated \"I don't want to do this now, I would rather wait till after treatment team\".   
Patient refused labs of Hemoglobin A1C and lipid panel. Labs rescheduled for tomorrow morning at 0600   
Patient requested antianxiety and medications for nausea.  Patient stated, \"I know the Abilify was going to  make me nauseated.\"  Staff offered to call the on-call for anti-nausea medication but patient said, \"I don't want anything, I will be fine.\"  Staff offered Ginger ale but patient refused to accept the ginger ale  Will continue to monitor..  
Patient took her bedtime abilify 5 mg. Patient states \"I only take abilify PRN at home, but the doctor here told me that medication is not PRN, but my doctor at home told me to take it PRN, and I know my body\". Patient wants a 10 mg pill because she doesn't like that the 5 mg was half of a pill. Patient told that the 5 mg may be a starting dose and the provider may increase the abilify to 10 mg, so she may be taking a whole 10 mg pill in the future. Patient appears to be very paranoid that the pill was cut in half.      
Pt c/o lower abdominal cramping due to menstrual cycle. Education provided; Motrin administered. Denies pain.  
Pt continues to decline labs at this time stating, \"I've already given enough blood and urine.\"  
Pt had no scheduled medications this am. She is expressing frustration regarding her voluntary commitment and upset when asked to sign in, \"I did not refuse!\" Pt verbalized awareness that she was becoming loud and immediately lowered her volume. Pt ased if she would like some prn anxiety medication and politely declined.   
Pt originally requested Motrin for 8/10 abdominal/generalized pain. When writer administering Ibprofen, pt looked at pill and did not believe it was the medication. Writer obtained medication. Writer showed pt the pill package, but stated \"No this is not what I took earlier for pain.\" Writer provided times and medications taken since admission, but pt does not believe she received them saying \"I have not taken anything since I've been here.\" Medication education provided. Writer agreed to receive Tylenol 650 mg @2033 for 8/10 generalized pain. Q15 min safety checks in place.   
Pt refused AM labs at this time. States \" I don't feel like getting jabbed in my arm with a needle at this time.\" Pt states she will let staff obtain blood later today. Will pass on to day shift.   
TRANSFER - IN REPORT:    Verbal report received from Rowan RODRÍGUEZ on Kaitlin Crespo  being received from [facility] ED for routine progression of patient care      Report consisted of patient's Situation, Background, Assessment and   Recommendations(SBAR).     Information from the following report(s) ED Encounter Summary, ED SBAR, and Recent Results was reviewed with the receiving nurse.    Opportunity for questions and clarification was provided.      Assessment completed upon patient's arrival to unit and care assumed.    
03/29/2024 04:22 PM      Lab Results   Component Value Date/Time     03/29/2024 04:22 PM    K 3.7 03/29/2024 04:22 PM     03/29/2024 04:22 PM    CO2 28 03/29/2024 04:22 PM    BUN 12 03/29/2024 04:22 PM    GLOB 4.0 03/29/2024 04:22 PM    ALT 20 03/29/2024 04:22 PM      Vitals:    04/05/24 0740   BP: (!) 132/97   Pulse: 80   Resp: 20   Temp:    SpO2: 100%      Physical Exam:  Body habitus: Body mass index is 29.47 kg/m².  Musculoskeletal system: normal gait  Tremor - neg  Cog wheeling - neg    Mental Status Exam:  M is a 33 y.o. YO female is in fair grooming. She has several teri in a bun. She is verbose but accepts re-drection.  Patient maintains eye contact throughout the evaluation  Psychomotor activity: WNL  Speech is spontaneous, with NL volume and prosody  Thought process is concrete  Mood is reported as \"okay.\" Affect is congruent, euthymic  Thought content is negative for suicidal or homicidal ideations, intents or plans  Denies experiencing hallucinations of any type  Perception is negative for paranoia or delusional thoughts  Insight/Judgment are both poor    Assessment and Plan:  Kaitlin Crespo meets criteria for a diagnosis of   Mood d/o NOS (rule out Bipolar Disorder, unspecified)    04/05/2024  Increase vraylar from 1.5mg po qday to 3mg po qday  Likely d/c Monday.    04/04/2024  Doesn't want to take abilify.  Discussed alternatively taking vraylar. She was agreeable to start vraylar 1.5mg po qday.  At the same time she believes that she doesn't want to take medications daily because she knows her body.      04/03/2024  Patient said that she would make her mind up about an alternative to abilify.    04/02/2024  Pt will discuss dx & tx options w/clinical pharmacy.  Will give patient the opportunity to consider medication options before filing petition for medications over objection.    04/01/2024  D/C zoloft.  Start abilify 5mg po qhs.    A coordinated, multidisplinary treatment team 
agreeable to start vraylar 1.5mg po qday.  At the same time she believes that she doesn't want to take medications daily because she knows her body.      04/03/2024  Patient said that she would make her mind up about an alternative to abilify.    04/02/2024  Pt will discuss dx & tx options w/clinical pharmacy.  Will give patient the opportunity to consider medication options before filing petition for medications over objection.    04/01/2024  D/C zoloft.  Start abilify 5mg po qhs.    A coordinated, multidisplinary treatment team round was conducted with the patient, nurses, pharmcist,  and writer present. Discussions held with , and/or with family members; Complete current electronic health record for patient was reviewed in full including consultant notes, ancillary staff notes, nurses and tech notes, labs and vitals.    I certify that this patients inpatient psychiatric hospital services furnished since the previous certification were, and continue to be, required for treatment that could reasonably be expected to improve the patient's condition, or for diagnostic study, and that the patient continues to need, on a daily basis, active treatment furnished directly by or requiring the supervision of inpatient psychiatric facility personnel. In addition, the hospital records show that services furnished were intensive treatment services, admission or related services, or equivalent services.

## 2024-04-07 NOTE — FLOWSHEET NOTE
04/07/24 3083   Pain Assessment   Pain Assessment 0-10   Pain Level 7   Patient's Stated Pain Goal 0 - No pain   Pain Location Teeth   Pain Orientation Left;Lower   Pain Descriptors Aching   Functional Pain Assessment Activities are not prevented     PRN Medication Documentation    Specific patient behavior that led to need for PRN medication: Pt requested medication.  PRN medication given: Tylenol

## 2024-04-07 NOTE — PROGRESS NOTES
Behavioral Services                                              Medicare Re-Certification    I certify that the inpatient psychiatric hospital services furnished since the previous certification/re-certification were, and continue to be, medically necessary for;    [x] (1) Treatment which could reasonably be expected to improve the patient's condition,    [] (2) Or for diagnostic study.    Estimated length of stay/service 3-5 days    Plan for post-hospital care Outpatient Psychiatry.    This patient continues to need, on a daily basis, active treatment furnished directly by or requiring the supervision of inpatient psychiatric personnel.    Electronically signed by Salvador Figueroa MD on 4/5/2024 at 2:24 PM   
      Behavioral Services  Medicare Certification Upon Admission    I certify that this patient's inpatient psychiatric hospital admission is medically necessary for:    [x] (1) Treatment which could reasonably be expected to improve this patient's condition,       [] (2) Or for diagnostic study;     AND     [x](2) The inpatient psychiatric services are provided while the individual is under the care of a physician and are included in the individualized plan of care.    Estimated length of stay/service 3-5 days    Plan for post-hospital care Outpatient Psychiatry.    Electronically signed by Salvador Figueroa MD on 4/1/2024 at 10:33 AM      
Admission Medication Reconciliation:    Information obtained from:  patient interview and Insurance claims data  RxQuery data available¹:  YES    Comments/Recommendations: Updated PTA meds/reviewed patient's allergies.    1)  Spoke with patient during treatment team. Patient stated her medications were hydroxyzine PRN for anxiety, sertraline PRN for sleep, and aripiprazole daily. Upon further discussion, patient has not been actively taking aripiprazole because she doesn't like this medication, leads to \"dysphoria\" or \"zoning\" and doesn't feel like normal self when she is taking. Patient states this was also prescribed back in 2018. Patient has been taking sertraline PRN for sleep, usually 3-4x a week, and hydroxyzine PRN for anxiety, usually 2-3x a week.     Medication History:  Quetiapine and risperidone: Not willing at this time to take these again. Patient unable to provide explanation of what reaction/side effects regarding these medication due to acute presentation    Admission history: Per patient admitted in 2018 at [facility] diagnosed with bipolar, schizophrenia.  Several ED visits for mental health:  -9/8/23 [facility] ED  -7/19/23 [facility] ED  -7/4/23: [facility] ED  -5/28/23: [facility] ED    2)  The [place] Prescription Monitoring Program () was assessed to determine fill history of any controlled medications. Unable to locate the patient in the database    3)  Medication changes (since last review):  Added  - n/a    Adjusted  - Patient taking sertraline as needed for sleep    Removed  - Aripiprazole 5 mg    ¹RxQuery pharmacy benefit data reflects medications filled and processed through the patient's insurance, however this data does NOT capture whether the medication was picked up or is currently being taken by the patient.    Allergies:  Penicillins and Peanut-containing drug products    Significant PMH/Disease States:   Past Medical History:   Diagnosis Date    Asthma     Bipolar 1 disorder (HCC)     Depression     
Laboratory Monitoring for Antipsychotics:    This patient is currently prescribed the following medication(s):   Current Facility-Administered Medications: ARIPiprazole (ABILIFY) tablet 5 mg, 5 mg, Oral, QHS  cloNIDine (CATAPRES) tablet 0.1 mg, 0.1 mg, Oral, Q6H PRN  acetaminophen (TYLENOL) tablet 650 mg, 650 mg, Oral, Q4H PRN  polyethylene glycol (GLYCOLAX) packet 17 g, 17 g, Oral, Daily PRN  senna (SENOKOT) tablet 8.6 mg, 1 tablet, Oral, Daily PRN  aluminum & magnesium hydroxide-simethicone (MAALOX) 200-200-20 MG/5ML suspension 30 mL, 30 mL, Oral, Q6H PRN  hydrOXYzine HCl (ATARAX) tablet 50 mg, 50 mg, Oral, TID PRN  haloperidol (HALDOL) tablet 5 mg, 5 mg, Oral, Q4H PRN **OR** haloperidol lactate (HALDOL) injection 5 mg, 5 mg, IntraMUSCular, Q4H PRN  diphenhydrAMINE (BENADRYL) injection 50 mg, 50 mg, IntraMUSCular, Q4H PRN  traZODone (DESYREL) tablet 50 mg, 50 mg, Oral, Nightly PRN  ibuprofen (ADVIL;MOTRIN) tablet 400 mg, 400 mg, Oral, Q6H PRN    The following labs have been completed for monitoring of antipsychotics and/or mood stabilizers:    Height, Weight, BMI Estimation  Estimated body mass index is 29.47 kg/m² as calculated from the following:    Height as of this encounter: 1.6 m (5' 2.99\").    Weight as of this encounter: 75.4 kg (166 lb 4.8 oz).     Vital Signs/Blood Pressure  /85   Pulse 84   Temp 98.2 °F (36.8 °C) (Oral)   Resp 16   Ht 1.6 m (5' 2.99\")   Wt 75.4 kg (166 lb 4.8 oz)   SpO2 100%   BMI 29.47 kg/m²     Renal Function, Hepatic Function and Chemistry  Estimated Creatinine Clearance: 76 mL/min (A) (based on SCr of 1.03 mg/dL (H)).    Lab Results   Component Value Date/Time     03/29/2024 04:22 PM    K 3.7 03/29/2024 04:22 PM     03/29/2024 04:22 PM    CO2 28 03/29/2024 04:22 PM    BUN 12 03/29/2024 04:22 PM    GLOB 4.0 03/29/2024 04:22 PM    ALT 20 03/29/2024 04:22 PM    AST 15 03/29/2024 04:22 PM       Glucose/Hemoglobin A1c  No results found for: \"GLU\", \"GLUCPOC\"  No 
Laboratory Monitoring for Antipsychotics:    This patient is currently prescribed the following medication(s):   Current Facility-Administered Medications: acetaminophen (TYLENOL) tablet 1,000 mg, 1,000 mg, Oral, Q6H PRN  ibuprofen (ADVIL;MOTRIN) tablet 600 mg, 600 mg, Oral, Q6H PRN  cariprazine hcl (VRAYLAR) capsule 1.5 mg, 1.5 mg, Oral, Daily  benzocaine (ORAJEL) 20 % mucosal gel, , Mouth/Throat, BID PRN  cloNIDine (CATAPRES) tablet 0.1 mg, 0.1 mg, Oral, Q6H PRN  polyethylene glycol (GLYCOLAX) packet 17 g, 17 g, Oral, Daily PRN  senna (SENOKOT) tablet 8.6 mg, 1 tablet, Oral, Daily PRN  aluminum & magnesium hydroxide-simethicone (MAALOX) 200-200-20 MG/5ML suspension 30 mL, 30 mL, Oral, Q6H PRN  hydrOXYzine HCl (ATARAX) tablet 50 mg, 50 mg, Oral, TID PRN  haloperidol (HALDOL) tablet 5 mg, 5 mg, Oral, Q4H PRN **OR** haloperidol lactate (HALDOL) injection 5 mg, 5 mg, IntraMUSCular, Q4H PRN  diphenhydrAMINE (BENADRYL) injection 50 mg, 50 mg, IntraMUSCular, Q4H PRN  traZODone (DESYREL) tablet 50 mg, 50 mg, Oral, Nightly PRN    The following labs have been completed for monitoring of antipsychotics and/or mood stabilizers:    Height, Weight, BMI Estimation  Estimated body mass index is 29.47 kg/m² as calculated from the following:    Height as of this encounter: 1.6 m (5' 2.99\").    Weight as of this encounter: 75.4 kg (166 lb 4.8 oz).     Vital Signs/Blood Pressure  BP (!) 132/97   Pulse 80   Temp 98.1 °F (36.7 °C) (Oral)   Resp 20   Ht 1.6 m (5' 2.99\")   Wt 75.4 kg (166 lb 4.8 oz)   SpO2 100%   BMI 29.47 kg/m²     Renal Function, Hepatic Function and Chemistry  Estimated Creatinine Clearance: 76 mL/min (A) (based on SCr of 1.03 mg/dL (H)).    Lab Results   Component Value Date/Time     03/29/2024 04:22 PM    K 3.7 03/29/2024 04:22 PM     03/29/2024 04:22 PM    CO2 28 03/29/2024 04:22 PM    BUN 12 03/29/2024 04:22 PM    GLOB 4.0 03/29/2024 04:22 PM    ALT 20 03/29/2024 04:22 PM    AST 15 03/29/2024 
Pharmacy Progress Note    Spoke with patient in day room regarding medication history with risperidone, lurasidone, aripiprazole, and quetiapine and potential medication use. Discussed and addressed to the best of our ability the patient's concern with taking medications while inpatient.     The patient's main concerns include:  - She states that she can control her triggers and prevent agitated behavior when she is out of here  - States she does not want to be sedated or \"out of it\" due to fears of something bad happening to her while she is here and \"cannot protect herself\"  - States medications will not fix her heartbreak  - Patient states she is not currently experiencing mood problems or any symptoms so she doesn't need medications at this time     Despite patient stating she is not currently symptomatic, patient would like a medication for her PMS which includes symptoms of irritability. Informed patient that while antipsychotics are not technically approved for PMS symptoms, they can help with irritability and agitation. Also discussed why sertraline and other antidepressants may not be the best option for her. Informed patient of the benefits of starting medications inpatient including being able to discuss effects and side effects with treatment team on a daily basis.   Specifically discussed medication options moving forward including, asenapine, cariprazine, and ziprasidone. Explained to patient that we believe cariprizine would be good choice for her given lower risk of sedation and can be taken in the morning.     Patient still resistant to medication use while inpatient. Encouraged patient to continue considering medication use and we will continue to discuss in treatment team.    
SPIRITUALITY GROUP      Meeting Topic: Methods of Spiritual Pathways    Meeting Time:  45-60 minutes    Meeting Goal: Patients will describe their personal definition of spirituality. The group will identify how their spiritual or Yarsani beliefs are relevant to their mental health. The  will discuss various approaches to making spiritual connections and invite the group to explore a new spiritual practice. The group will conclude with a guided meditation.     Today's meeting focus: Gratitude    Kaitlin Crespo attended and participated in the group appropriately respective of their current diagnosis.        For additional spiritual care, please contact the  on-call at ([phone]).    Liv Escalante MDiv, MS, [facility]  Staff   Spiritual Health Services  
mood and affect, no focal deficits, sensory grossly within normal limit, moving all extremities.   Skin: warm/dry   Data Review:    Review and/or order of clinical lab test  Review and/or order of tests in the radiology section of [facility]    I have personally and independently reviewed all pertinent labs, diagnostic studies, imaging, and have provided independent interpretation of the same.     Labs:   No results for input(s): \"WBC\", \"HGB\", \"HCT\", \"PLT\" in the last 72 hours.  No results for input(s): \"NA\", \"K\", \"CL\", \"CO2\", \"BUN\", \"CREA\", \"GLU\", \"CA\", \"MG\", \"PHOS\", \"URICA\" in the last 72 hours.  No results for input(s): \"ALT\", \"TP\", \"ALB\", \"GLOB\", \"GGT\", \"AML\" in the last 72 hours.    Invalid input(s): \"SGOT\", \"GPT\", \"AP\", \"TBIL\", \"TBILI\", \"AMYP\", \"LPSE\", \"HLPSE\"  No results for input(s): \"INR\", \"APTT\" in the last 72 hours.    Invalid input(s): \"PTP\"   No results for input(s): \"TIBC\", \"FERR\" in the last 72 hours.    Invalid input(s): \"FE\", \"PSAT\"   No results found for: \"FOL\", \"RBCF\"   No results for input(s): \"PH\", \"PCO2\", \"PO2\" in the last 72 hours.  No results for input(s): \"CPK\" in the last 72 hours.    Invalid input(s): \"CPKMB\", \"CKNDX\", \"TROIQ\"  Lab Results   Component Value Date/Time    CHOL 94 04/05/2024 06:17 AM    HDL 65 04/05/2024 06:17 AM     No results found for: \"GLUCPOC\"  [unfilled]    Notes reviewed from all clinical/nonclinical/nursing services involved in patient's clinical care. Care coordination discussions were held with appropriate clinical/nonclinical/ nursing providers based on care coordination needs.         Patients current active Medications were reviewed, considered, added and adjusted based on the clinical condition today.      Home Medications were reconciled to the best of my ability given all available resources at the time of admission. Route is PO if not otherwise noted.      Admission Status:[id number]:::1}      Medications Reviewed:     Current Facility-Administered Medications

## 2024-04-07 NOTE — PLAN OF CARE
Problem: Pain  Goal: Verbalizes/displays adequate comfort level or baseline comfort level  Outcome: Progressing     Problem: Morenita  Goal: Will exhibit normal sleep and speech and no impulsivity  Description: INTERVENTIONS:  1. Administer medication as ordered  2. Set limits on impulsive behavior  3. Make attempts to decrease external stimuli as possible  4/7/2024 0012 by Jammie Meehan RN  Outcome: Progressing

## 2024-04-08 ENCOUNTER — HOSPITAL ENCOUNTER (EMERGENCY)
Facility: HOSPITAL | Age: 34
Discharge: HOME | End: 2024-04-08
Attending: EMERGENCY MEDICINE
Payer: MEDICARE

## 2024-04-08 VITALS
BODY MASS INDEX: 29.13 KG/M2 | SYSTOLIC BLOOD PRESSURE: 127 MMHG | HEIGHT: 63 IN | TEMPERATURE: 97.5 F | HEART RATE: 88 BPM | RESPIRATION RATE: 16 BRPM | DIASTOLIC BLOOD PRESSURE: 94 MMHG | OXYGEN SATURATION: 100 % | WEIGHT: 164.4 LBS

## 2024-04-08 VITALS
WEIGHT: 165 LBS | DIASTOLIC BLOOD PRESSURE: 91 MMHG | BODY MASS INDEX: 29.23 KG/M2 | OXYGEN SATURATION: 98 % | TEMPERATURE: 97.8 F | RESPIRATION RATE: 20 BRPM | HEART RATE: 94 BPM | SYSTOLIC BLOOD PRESSURE: 139 MMHG | HEIGHT: 63 IN

## 2024-04-08 DIAGNOSIS — R07.89 ATYPICAL CHEST PAIN: Primary | ICD-10-CM

## 2024-04-08 DIAGNOSIS — F41.1 ANXIETY STATE: ICD-10-CM

## 2024-04-08 PROCEDURE — 6370000000 HC RX 637 (ALT 250 FOR IP): Performed by: PSYCHIATRY & NEUROLOGY

## 2024-04-08 PROCEDURE — 99283 EMERGENCY DEPT VISIT LOW MDM: CPT

## 2024-04-08 PROCEDURE — 93005 ELECTROCARDIOGRAM TRACING: CPT | Performed by: EMERGENCY MEDICINE

## 2024-04-08 RX ORDER — HYDROXYZINE 50 MG/1
50 TABLET, FILM COATED ORAL 2 TIMES DAILY PRN
Qty: 60 TABLET | Refills: 0 | Status: SHIPPED | OUTPATIENT
Start: 2024-04-08 | End: 2024-05-08

## 2024-04-08 RX ADMIN — CARIPRAZINE 3 MG: 1.5 CAPSULE, GELATIN COATED ORAL at 09:08

## 2024-04-08 RX ADMIN — ACETAMINOPHEN 1000 MG: 500 TABLET ORAL at 09:09

## 2024-04-08 ASSESSMENT — PAIN SCALES - GENERAL
PAINLEVEL_OUTOF10: 10
PAINLEVEL_OUTOF10: 6

## 2024-04-08 ASSESSMENT — PAIN DESCRIPTION - ORIENTATION: ORIENTATION: LOWER;LEFT

## 2024-04-08 ASSESSMENT — PAIN DESCRIPTION - LOCATION
LOCATION: CHEST
LOCATION: TEETH

## 2024-04-08 ASSESSMENT — PAIN - FUNCTIONAL ASSESSMENT: PAIN_FUNCTIONAL_ASSESSMENT: 0-10

## 2024-04-08 ASSESSMENT — PAIN SCALES - WONG BAKER: WONGBAKER_NUMERICALRESPONSE: NO HURT

## 2024-04-08 ASSESSMENT — PAIN DESCRIPTION - DESCRIPTORS: DESCRIPTORS: ACHING

## 2024-04-08 NOTE — ED TRIAGE NOTES
Pt reports anxiety that began PTA. Pt reports chest pain rated at 10/10. Pt denies SOB. Pt states she had stressful event initiating anxiety, does not detail. Pt denies SI/HI at this time.

## 2024-04-08 NOTE — PLAN OF CARE
Problem: Safety - Adult  Goal: Free from fall injury  4/8/2024 0041 by Lizabeth Payne LPN  Outcome: Progressing     Pt received resting in bed with eyes closed. Breathing is even and unlabored. Walkways are free and clear from clutter.

## 2024-04-08 NOTE — INTERDISCIPLINARY ROUNDS
Behavioral Health Interdisciplinary Rounds     Patient Name: Kaitlin Crespo  Age: 33 y.o.  Room/Bed:  [facility]  Primary Diagnosis: Bipolar disorder (HCC)   Admission Status: Involuntary Commitment    Readmission within 30 days: No  Power of  in place: No  Patient requires a blocked bed: No           Participation in Care/Groups:  Yes  Medication Compliant?: Yes  PRNS (last 24 hours): Pain   Restraints (last 24 hours):  No  __________________________________________________  OQ Admission Analysis Survey completed:  OQ Admission Analysis Survey score:  __________________________________________________     Alcohol screening (AUDIT) completed -     If applicable, date SBIRT discussed in treatment team AND documented:    Tobacco - patient is a smoker:    Illegal Drugs use:      24 hour chart check complete: Yes    _______________________________________________    Patient goal(s) for today:   Treatment team focus/goals:   Progress note:      Spiritual Care Consult:   Financial concerns/prescription coverage:    Family contact:                        Family requesting physician contact today:    Discharge plan:   Access to weapons :                                                              Outpatient provider(s):   Patient's preferred phone number for follow up call :   Patient's preferred e-mail address :    LOS:  9  Expected LOS:     Participating treatment team members: Kaitlin Crespo, * (assigned SW),

## 2024-04-08 NOTE — PLAN OF CARE
Problem: Anxiety  Goal: Will report anxiety at manageable levels  Description: INTERVENTIONS:  1. Administer medication as ordered  2. Teach and rehearse alternative coping skills  3. Provide emotional support with 1:1 interaction with staff  Outcome: Progressing     Problem: Pain  Goal: Verbalizes/displays adequate comfort level or baseline comfort level  Outcome: Progressing     Problem: Morenita  Goal: Will exhibit normal sleep and speech and no impulsivity  Description: INTERVENTIONS:  1. Administer medication as ordered  2. Set limits on impulsive behavior  3. Make attempts to decrease external stimuli as possible  Outcome: Progressing    Patient is cooperative, mood is euthymic, affect brightened. Future focused, plan for discharge, returning to home, getting back to school and taking care of son. Reports sleeping well. Medication compliant.  Denies si/hi/a/v/h

## 2024-04-08 NOTE — ED PROVIDER NOTES
[facility] EMERGENCY DEPT  EMERGENCY DEPARTMENT HISTORY AND PHYSICAL EXAM      Date: 4/8/2024  Patient Name: Kaitlin Crespo  MRN: [id number]  YOB: 1990  Date of evaluation: 4/8/2024  Provider: Hayley Weiss MD   Note Started: 7:09 PM EDT 4/8/24    HISTORY OF PRESENT ILLNESS   No chief complaint on file.      History Provided By: Patient    HPI: Kaitlin Crespo is a 33 y.o. female     PAST MEDICAL HISTORY   Past Medical History:  Past Medical History:   Diagnosis Date    Asthma     Bipolar 1 disorder (HCC)     Depression     Psychosis (HCC)     Schizophrenia (HCC)        Past Surgical History:  No past surgical history on file.    Family History:  No family history on file.    Social History:  Social History     Tobacco Use    Smoking status: Former     Current packs/day: 0.25     Types: Cigarettes     Passive exposure: Never    Smokeless tobacco: Never   Vaping Use    Vaping Use: Never used   Substance Use Topics    Alcohol use: Not Currently    Drug use: Not Currently       Allergies:  Allergies   Allergen Reactions    Penicillins Hives    Peanut-Containing Drug Products Hives and Swelling       PCP: Benjamin Dimas Sr., MD    Current Meds:   No current facility-administered medications for this encounter.     Current Outpatient Medications   Medication Sig Dispense Refill    hydrOXYzine HCl (ATARAX) 50 MG tablet Take 1 tablet by mouth 2 times daily as needed for Anxiety 60 tablet 0    [START ON 4/9/2024] cariprazine hcl (VRAYLAR) 3 MG CAPS capsule Take 1 capsule by mouth daily 30 capsule 0       Social Determinants of Health:   Social Determinants of Health     Tobacco Use: Medium Risk (3/29/2024)    Patient History     Smoking Tobacco Use: Former     Smokeless Tobacco Use: Never     Passive Exposure: Never   Alcohol Use: Heavy Drinker (3/30/2024)    AUDIT-C     Frequency of Alcohol Consumption: 2-4 times a month     Average Number of Drinks: 3 or 4     Frequency of Binge Drinking:

## 2024-04-08 NOTE — TRANSITION OF CARE
taking on: April 9, 2024     hydrOXYzine HCl 50 MG tablet  Commonly known as: ATARAX  Take 1 tablet by mouth 2 times daily as needed for Anxiety            STOP taking these medications      ARIPiprazole 5 MG tablet  Commonly known as: ABILIFY     hydrOXYzine pamoate 25 MG capsule  Commonly known as: VISTARIL     sertraline 50 MG tablet  Commonly known as: ZOLOFT               Where to Get Your Medications        These medications were sent to [place], [place] - [address] [place] [address] -  [phone] - F [phone]  [address] [place] [id number]      Phone: [phone]   cariprazine hcl 3 MG Caps capsule  hydrOXYzine HCl 50 MG tablet         NO PENDING LABS  To obtain results of studies pending at discharge, please contact [phone]    Follow-up Information       Follow up With Specialties Details Why Contact Info    D19  Go to within 30 days to establish counseling and medication management services  [facility] M-F 830am-1pm [address] [facility] [address] [phone]             Advanced Directive:   Does the patient have an appointed surrogate decision maker? No  Does the patient have a Medical Advance Directive? No  Does the patient have a Psychiatric Advance Directive? No  If the patient does not have a surrogate or Medical Advance Directive AND Psychiatric Advance Directive, the patient was offered information on these advance directives Declined    Patient Instructions: Please continue all medications until otherwise directed by physician.      Tobacco Cessation Discharge Plan:   Is the patient a smoker and needs referral for smoking cessation? Yes  Patient referred to the following for smoking cessation with an appointment? No     Patient was offered medication to assist with smoking cessation at discharge? No  Was education for smoking cessation added to the discharge instructions? Yes    Alcohol/Substance Abuse Discharge Plan:   Does the patient have

## 2024-04-08 NOTE — DISCHARGE SUMMARY
DISCHARGE SUMMARY    Some parts of the discharge summary are from the initial Psychiatric interview that was done on admission by the admitting psychiatrist.     Date of Admission: 3/30/2024    Date of Discharge: 4/8/2024     TYPE OF DISCHARGE:   REGULAR -  YES    ADMISSION EVALUATION:     Legal: Voluntary commitment.     Chief Complaint:  \"What is this place.\"     Length of Stay: 3 Days     Interval History:  3/31/24  Patient admitted through the emergency department on [facility] after a period of aggression and destruction of property at her apartment.  She reports her neighbor really triggered her and she knows better how to manage this now.  She has been prescribed medications including Abilify 5, Zoloft, and hydroxyzine for bipolar which she is struggling with the diagnosis but taking medications for the last week or so.  She reports she has periods of up and down moods but \"nothing I cannot manage with good coping skills\".  She has poor insight into her mental illness.  Denies any acute thoughts of suicide, homicide, or having audiovisual hallucinations.  Reports she did sleep pretty okay last night but was anxious.  No disruptions noted by nursing last night.  No further complaints or psychiatric symptoms reported today.  She is worried about a new job and does not want to lose it.  Her son is with her mother.        She says that she was on seroquel in the past that didn't help her.     LMP  Started Thursday     Allergies:       Allergies   Allergen Reactions    Penicillins Hives    Peanut-Containing Drug Products Hives and Swelling      Past Medical History:  Past Medical History        Past Medical History:   Diagnosis Date    Asthma      Bipolar 1 disorder (HCC)      Depression      Psychosis (HCC)      Schizophrenia (HCC)            Scheduled Medications    ARIPiprazole  5 mg Oral QHS      Labs:        Lab Results   Component Value Date/Time     WBC 11.8 03/29/2024 04:22 PM     HGB 12.0 03/29/2024 04:22 PM

## 2024-04-09 LAB
EKG ATRIAL RATE: 104 BPM
EKG DIAGNOSIS: NORMAL
EKG P AXIS: 86 DEGREES
EKG P-R INTERVAL: 125 MS
EKG Q-T INTERVAL: 325 MS
EKG QRS DURATION: 77 MS
EKG QTC CALCULATION (BAZETT): 430 MS
EKG R AXIS: 68 DEGREES
EKG T AXIS: 7 DEGREES
EKG VENTRICULAR RATE: 105 BPM

## 2024-08-11 ENCOUNTER — HOSPITAL ENCOUNTER (EMERGENCY)
Facility: HOSPITAL | Age: 34
Discharge: HOME | End: 2024-08-12
Attending: EMERGENCY MEDICINE
Payer: MEDICARE

## 2024-08-11 VITALS
DIASTOLIC BLOOD PRESSURE: 98 MMHG | SYSTOLIC BLOOD PRESSURE: 142 MMHG | WEIGHT: 164.4 LBS | RESPIRATION RATE: 16 BRPM | BODY MASS INDEX: 29.13 KG/M2 | OXYGEN SATURATION: 100 % | HEIGHT: 63 IN | HEART RATE: 91 BPM | TEMPERATURE: 98.3 F

## 2024-08-11 DIAGNOSIS — F41.1 ANXIETY STATE: Primary | ICD-10-CM

## 2024-08-11 PROCEDURE — 6370000000 HC RX 637 (ALT 250 FOR IP): Performed by: EMERGENCY MEDICINE

## 2024-08-11 PROCEDURE — 99283 EMERGENCY DEPT VISIT LOW MDM: CPT

## 2024-08-11 RX ORDER — ARIPIPRAZOLE 5 MG/1
TABLET ORAL
COMMUNITY
Start: 2024-06-06

## 2024-08-11 RX ORDER — HYDROXYZINE HYDROCHLORIDE 25 MG/1
25 TABLET, FILM COATED ORAL 3 TIMES DAILY PRN
Qty: 15 TABLET | Refills: 0 | Status: SHIPPED | OUTPATIENT
Start: 2024-08-11 | End: 2024-08-16

## 2024-08-11 RX ORDER — NAPROXEN 250 MG/1
375 TABLET ORAL
Status: COMPLETED | OUTPATIENT
Start: 2024-08-11 | End: 2024-08-11

## 2024-08-11 RX ORDER — HYDROXYZINE PAMOATE 25 MG/1
CAPSULE ORAL
COMMUNITY
Start: 2024-06-06 | End: 2024-08-11

## 2024-08-11 RX ORDER — HYDROXYZINE 50 MG/1
50 TABLET, FILM COATED ORAL
Status: COMPLETED | OUTPATIENT
Start: 2024-08-11 | End: 2024-08-11

## 2024-08-11 RX ADMIN — HYDROXYZINE HYDROCHLORIDE 50 MG: 50 TABLET, FILM COATED ORAL at 23:05

## 2024-08-11 RX ADMIN — NAPROXEN 375 MG: 250 TABLET ORAL at 23:59

## 2024-08-11 ASSESSMENT — LIFESTYLE VARIABLES
HOW MANY STANDARD DRINKS CONTAINING ALCOHOL DO YOU HAVE ON A TYPICAL DAY: PATIENT DOES NOT DRINK
HOW OFTEN DO YOU HAVE A DRINK CONTAINING ALCOHOL: NEVER

## 2024-08-11 ASSESSMENT — PAIN - FUNCTIONAL ASSESSMENT: PAIN_FUNCTIONAL_ASSESSMENT: NONE - DENIES PAIN

## 2024-08-12 ASSESSMENT — ENCOUNTER SYMPTOMS
GASTROINTESTINAL NEGATIVE: 1
RESPIRATORY NEGATIVE: 1

## 2024-08-12 NOTE — ED NOTES
Patient discharged at this time.  Pt verbalized understanding of d/c instructions.  Pt requesting to speak w/ counselor.  Pt given written information on [facility] and [facility].  Pt states she will return back to shelter.  Pt in no acute distress at time of discharge.  All of pt's belongings w/ pt at this time.

## 2024-08-12 NOTE — ED NOTES
[facility] EMERGENCY DEPT  EMERGENCY DEPARTMENT ENCOUNTER      Pt Name: Kaitlin Crespo  MRN: [id number]  Birthdate 1990  Date of evaluation: 8/11/2024  Provider: Pk rOtiz MD  11:00 PM    CHIEF COMPLAINT       Chief Complaint   Patient presents with    Anxiety         HISTORY OF PRESENT ILLNESS    Kaitlin Crespo is a 34 y.o. female who presents to the emergency department with complaint of sudden onset of anxiety.  She usually takes hydroxyzine but is out of medication.  She denies suicidal ideation or hallucinations.  Denies substance use or alcohol use.    HPI    Nursing Notes were reviewed.    REVIEW OF SYSTEMS       Review of Systems   Constitutional: Negative.    HENT: Negative.     Respiratory: Negative.     Cardiovascular: Negative.    Gastrointestinal: Negative.    Neurological: Negative.    Hematological: Negative.    Psychiatric/Behavioral:  Negative for suicidal ideas. The patient is nervous/anxious.        Except as noted above the remainder of the review of systems was reviewed and negative.       PAST MEDICAL HISTORY     Past Medical History:   Diagnosis Date    Asthma     Bipolar 1 disorder (HCC)     Depression     Psychosis (HCC)     Schizophrenia (HCC)          SURGICAL HISTORY     History reviewed. No pertinent surgical history.      CURRENT MEDICATIONS       Previous Medications    ARIPIPRAZOLE (ABILIFY) 5 MG TABLET        HYDROXYZINE PAMOATE (VISTARIL) 25 MG CAPSULE        SERTRALINE (ZOLOFT) 50 MG TABLET           ALLERGIES     Penicillins and Peanut-containing drug products    FAMILY HISTORY     History reviewed. No pertinent family history.       SOCIAL HISTORY       Social History     Socioeconomic History    Marital status: Single     Spouse name: None    Number of children: None    Years of education: None    Highest education level: None   Tobacco Use    Smoking status: Every Day     Current packs/day: 0.25     Types: Cigarettes     Passive exposure: Never    Smokeless

## 2024-08-12 NOTE — ED TRIAGE NOTES
Patient states she is living in a shelter and tonight she started having an anxiety attack.  Pt denies any CP or SOB.  Pt has been out of home medications x 2 months.  Pt denies any SI/HI.  Pt ambulatory to treatment area.  Pt sats 100% on RA.

## 2024-09-07 ENCOUNTER — HOSPITAL ENCOUNTER (EMERGENCY)
Facility: HOSPITAL | Age: 34
Discharge: HOME | End: 2024-09-07
Attending: EMERGENCY MEDICINE
Payer: MEDICARE

## 2024-09-07 VITALS
WEIGHT: 167 LBS | HEIGHT: 63 IN | BODY MASS INDEX: 29.59 KG/M2 | RESPIRATION RATE: 20 BRPM | TEMPERATURE: 97.2 F | OXYGEN SATURATION: 98 % | HEART RATE: 69 BPM | DIASTOLIC BLOOD PRESSURE: 97 MMHG | SYSTOLIC BLOOD PRESSURE: 152 MMHG

## 2024-09-07 DIAGNOSIS — F32.9 REACTIVE DEPRESSION: Primary | ICD-10-CM

## 2024-09-07 PROCEDURE — 99283 EMERGENCY DEPT VISIT LOW MDM: CPT

## 2024-09-07 PROCEDURE — 6370000000 HC RX 637 (ALT 250 FOR IP): Performed by: EMERGENCY MEDICINE

## 2024-09-07 RX ORDER — IBUPROFEN 800 MG/1
800 TABLET, FILM COATED ORAL
Status: COMPLETED | OUTPATIENT
Start: 2024-09-07 | End: 2024-09-07

## 2024-09-07 RX ORDER — ONDANSETRON 4 MG/1
4 TABLET, ORALLY DISINTEGRATING ORAL
Status: COMPLETED | OUTPATIENT
Start: 2024-09-07 | End: 2024-09-07

## 2024-09-07 RX ORDER — ACETAMINOPHEN 325 MG/1
650 TABLET ORAL
Status: COMPLETED | OUTPATIENT
Start: 2024-09-07 | End: 2024-09-07

## 2024-09-07 RX ADMIN — ACETAMINOPHEN 650 MG: 325 TABLET ORAL at 20:09

## 2024-09-07 RX ADMIN — IBUPROFEN 800 MG: 800 TABLET, FILM COATED ORAL at 20:09

## 2024-09-07 RX ADMIN — ONDANSETRON 4 MG: 4 TABLET, ORALLY DISINTEGRATING ORAL at 20:10

## 2024-09-07 ASSESSMENT — ENCOUNTER SYMPTOMS
ALLERGIC/IMMUNOLOGIC NEGATIVE: 1
RESPIRATORY NEGATIVE: 1
GASTROINTESTINAL NEGATIVE: 1
EYES NEGATIVE: 1

## 2024-09-07 ASSESSMENT — PAIN SCALES - GENERAL: PAINLEVEL_OUTOF10: 0

## 2024-09-07 ASSESSMENT — PAIN - FUNCTIONAL ASSESSMENT: PAIN_FUNCTIONAL_ASSESSMENT: 0-10

## 2024-09-07 NOTE — ED TRIAGE NOTES
Pt reports headache, focus to breathing pattern, and intermittent nausea. Pt states it has been ongoing. Pt denies any hx. Pt reports feeling very emotional and has been crying a lot today. When asked if she feels depressed pt reports familial issues that have been straining on her. Pt states she is unsure if that is unrelated. Pt denies feeling overwhelmed at this time. Denies SI/HI.

## 2024-09-07 NOTE — ED NOTES
[employer] consult ordered and computer placed in patient's room. No other needs expressed at this time.

## 2024-09-07 NOTE — ED PROVIDER NOTES
[facility] EMERGENCY DEPT  EMERGENCY DEPARTMENT ENCOUNTER      Pt Name: Kaitlin Crespo  MRN: [id number]  Birthdate 1990  Date of evaluation: 9/7/2024  Provider: Austin Erickson MD  7:56 PM    CHIEF COMPLAINT       Chief Complaint   Patient presents with    Family/emotional Distress         HISTORY OF PRESENT ILLNESS    Kaitlin Crespo is a 34 y.o. female who presents to the emergency department with complaint of being depressed. She is tearful and complains of family issues. She denies suicidalideation. Also complains of a headache 5/10 intensity. She did not take any medications I have attempted without success to contact this patient by phone to . No other complaints     HPI    Nursing Notes were reviewed.    REVIEW OF SYSTEMS       Review of Systems   Constitutional: Negative.    HENT: Negative.     Eyes: Negative.    Respiratory: Negative.     Cardiovascular: Negative.    Gastrointestinal: Negative.    Endocrine: Negative.    Genitourinary: Negative.    Musculoskeletal: Negative.    Allergic/Immunologic: Negative.    Neurological:  Positive for headaches.   Hematological: Negative.    Psychiatric/Behavioral: Negative.          Depressed        Except as noted above the remainder of the review of systems was reviewed and negative.       PAST MEDICAL HISTORY     Past Medical History:   Diagnosis Date    Asthma     Bipolar 1 disorder (HCC)     Depression     Psychosis (HCC)     Schizophrenia (HCC)          SURGICAL HISTORY     History reviewed. No pertinent surgical history.      CURRENT MEDICATIONS       Previous Medications    ARIPIPRAZOLE (ABILIFY) 5 MG TABLET        SERTRALINE (ZOLOFT) 50 MG TABLET           ALLERGIES     Penicillins and Peanut-containing drug products    FAMILY HISTORY     History reviewed. No pertinent family history.       SOCIAL HISTORY       Social History     Socioeconomic History    Marital status: Single     Spouse name: None    Number of children: None    Years of education: None     Highest education level: None   Tobacco Use    Smoking status: Every Day     Current packs/day: 0.25     Types: Cigarettes     Passive exposure: Never    Smokeless tobacco: Never   Vaping Use    Vaping status: Never Used   Substance and Sexual Activity    Alcohol use: Not Currently    Drug use: Not Currently    Sexual activity: Not Currently     Partners: Male     Social Determinants of Health     Food Insecurity: No Food Insecurity (3/30/2024)    Hunger Vital Sign     Worried About Running Out of Food in the Last Year: Never true     Ran Out of Food in the Last Year: Never true   Transportation Needs: No Transportation Needs (3/30/2024)    PRAPARE - Transportation     Lack of Transportation (Medical): No     Lack of Transportation (Non-Medical): No   Housing Stability: High Risk (3/30/2024)    Housing Stability Vital Sign     Unable to Pay for Housing in the Last Year: No     Number of Places Lived in the Last Year: 1     Unstable Housing in the Last Year: Yes       SCREENINGS         Jaleel Coma Scale  Eye Opening: Spontaneous  Best Verbal Response: Oriented  Best Motor Response: Obeys commands  Jaleel Coma Scale Score: 15                     CIWA Assessment  BP: (!) 152/97  Pulse: 69                 PHYSICAL EXAM       ED Triage Vitals [09/07/24 1817]   BP Systolic BP Percentile Diastolic BP Percentile Temp Temp src Pulse Respirations SpO2   (!) 152/97 -- -- 97.2 °F (36.2 °C) -- 69 20 98 %      Height Weight - Scale         1.6 m (5' 3\") 75.8 kg (167 lb)             Physical Exam    DIAGNOSTIC RESULTS     EKG: All EKG's are interpreted by the Emergency Department Physician who either signs or Co-signs this chart in the absence of a cardiologist.        RADIOLOGY:   Non-plain film images such as CT, Ultrasound and MRI are read by the radiologist. Plain radiographic images are visualized and preliminarily interpreted by the emergency physician with the below findings:        Interpretation per the Radiologist

## 2024-09-08 NOTE — [employer]SM[employer] NOTE
[employer] assessment completed, and suicide risk level noted to be no risk . Primary Nurse Melodie  and Charge Nurse N/a  and Physician Dre notified. Concerns not observed.

## 2024-09-08 NOTE — BSMART NOTE
Comprehensive Assessment Form Part 1      Section I - Disposition    Primary Diagnosis: Depression                                   Anxiety   Secondary Diagnosis:     The Medical Doctor to Psychiatrist conference was notcompleted.  The Medical Doctor is in agreement with Psychiatrist disposition because of (reason) pt doesn't meet criteria .  The plan is discharge and follow-up with mental health providers .  The on-call Psychiatrist consulted was  .  The admitting Psychiatrist will be  .  The admitting Diagnosis is .  The Payor source is Medicare .      This writer reviewed the [place] Suicide Severity Rating Scale in nursing flowsheet and the risk level assigned is no risk.  Based on this assessment, the risk of suicide is no risk and the plan is discharge home and follow-up with provider .     Section II - Integrated Summary  Summary:  Pt presented to the ED with complaints of a headache and depression. Pt was seen via telehealth . Pt was standing up in the room. Pt reported she is overwhelmed with family stress. Pt was homeless over 2 years ago and lost custody of her son. Pt's son resides with her mother and she has had supervised visits up until just recently. Pt recently lost her job and she just applied for unemployment. Pt is currently living with a male friend.   Pt denies SI/HI AH/VH. Pt has had previous psychiatric admission and receives mental health treatment thru [facility]. Pt is in process of looking for a therapist.   The patient has demonstrated mental capacity to provide informed consent.  The information is given by the patient and previous medical records   The Chief Complaint is overwhelmed .  The Precipitant Factors are family stressors/lost job .  Previous Hospitalizations: [facility]   The patient has not previously been in restraints.  Current Psychiatrist and/or  is  [facility] .    Lethality Assessment:    The potential for suicide noted by the following: not

## 2024-09-17 ENCOUNTER — HOSPITAL ENCOUNTER (EMERGENCY)
Facility: HOSPITAL | Age: 34
Discharge: HOME | End: 2024-09-17
Attending: EMERGENCY MEDICINE
Payer: MEDICARE

## 2024-09-17 VITALS
HEART RATE: 90 BPM | RESPIRATION RATE: 16 BRPM | DIASTOLIC BLOOD PRESSURE: 91 MMHG | BODY MASS INDEX: 26.84 KG/M2 | WEIGHT: 167 LBS | SYSTOLIC BLOOD PRESSURE: 148 MMHG | TEMPERATURE: 98.6 F | HEIGHT: 66 IN | OXYGEN SATURATION: 100 %

## 2024-09-17 DIAGNOSIS — J03.90 ACUTE TONSILLITIS, UNSPECIFIED ETIOLOGY: ICD-10-CM

## 2024-09-17 DIAGNOSIS — F41.1 ANXIETY STATE: Primary | ICD-10-CM

## 2024-09-17 LAB — DEPRECATED S PYO AG THROAT QL EIA: NEGATIVE

## 2024-09-17 PROCEDURE — 87880 STREP A ASSAY W/OPTIC: CPT

## 2024-09-17 PROCEDURE — 87070 CULTURE OTHR SPECIMN AEROBIC: CPT

## 2024-09-17 PROCEDURE — 99283 EMERGENCY DEPT VISIT LOW MDM: CPT

## 2024-09-17 PROCEDURE — 6370000000 HC RX 637 (ALT 250 FOR IP): Performed by: EMERGENCY MEDICINE

## 2024-09-17 RX ORDER — CLINDAMYCIN HCL 300 MG
300 CAPSULE ORAL 4 TIMES DAILY
Qty: 40 CAPSULE | Refills: 0 | Status: SHIPPED | OUTPATIENT
Start: 2024-09-17 | End: 2024-09-27

## 2024-09-17 RX ORDER — CLINDAMYCIN HCL 150 MG
300 CAPSULE ORAL
Status: COMPLETED | OUTPATIENT
Start: 2024-09-17 | End: 2024-09-17

## 2024-09-17 RX ADMIN — CLINDAMYCIN HYDROCHLORIDE 300 MG: 150 CAPSULE ORAL at 02:39

## 2024-09-17 ASSESSMENT — PAIN SCALES - GENERAL: PAINLEVEL_OUTOF10: 6

## 2024-09-17 ASSESSMENT — PAIN - FUNCTIONAL ASSESSMENT
PAIN_FUNCTIONAL_ASSESSMENT: ACTIVITIES ARE NOT PREVENTED
PAIN_FUNCTIONAL_ASSESSMENT: 0-10

## 2024-09-17 ASSESSMENT — PAIN DESCRIPTION - PAIN TYPE: TYPE: ACUTE PAIN

## 2024-09-17 ASSESSMENT — PAIN DESCRIPTION - LOCATION: LOCATION: CHEST

## 2024-09-18 LAB
BACTERIA SPEC CULT: NORMAL
Lab: NORMAL

## 2024-09-18 ASSESSMENT — ENCOUNTER SYMPTOMS
GASTROINTESTINAL NEGATIVE: 1
EYES NEGATIVE: 1
SORE THROAT: 1
RESPIRATORY NEGATIVE: 1

## 2025-02-16 ENCOUNTER — HOSPITAL ENCOUNTER (EMERGENCY)
Facility: HOSPITAL | Age: 35
Discharge: HOME | End: 2025-02-16
Attending: FAMILY MEDICINE
Payer: MEDICARE

## 2025-02-16 VITALS
HEART RATE: 71 BPM | SYSTOLIC BLOOD PRESSURE: 144 MMHG | RESPIRATION RATE: 17 BRPM | OXYGEN SATURATION: 100 % | TEMPERATURE: 97.9 F | DIASTOLIC BLOOD PRESSURE: 92 MMHG

## 2025-02-16 DIAGNOSIS — M79.10 MYALGIA: Primary | ICD-10-CM

## 2025-02-16 PROCEDURE — 6370000000 HC RX 637 (ALT 250 FOR IP): Performed by: FAMILY MEDICINE

## 2025-02-16 PROCEDURE — 99283 EMERGENCY DEPT VISIT LOW MDM: CPT

## 2025-02-16 RX ORDER — IBUPROFEN 600 MG/1
600 TABLET, FILM COATED ORAL
Status: COMPLETED | OUTPATIENT
Start: 2025-02-16 | End: 2025-02-16

## 2025-02-16 RX ADMIN — IBUPROFEN 600 MG: 600 TABLET, FILM COATED ORAL at 01:35

## 2025-02-16 ASSESSMENT — PAIN - FUNCTIONAL ASSESSMENT: PAIN_FUNCTIONAL_ASSESSMENT: 0-10

## 2025-02-16 ASSESSMENT — PAIN SCALES - GENERAL: PAINLEVEL_OUTOF10: 8

## 2025-02-16 NOTE — ED PROVIDER NOTES
[facility] EMERGENCY DEPT  EMERGENCY DEPARTMENT HISTORY AND PHYSICAL EXAM      Date: 2/16/2025  Patient Name: Kaitlin Crespo  MRN: [id number]  Birthdate 1990  Date of evaluation: 2/16/2025  Provider: Axel Jones MD   Note Started: 5:23 AM EST 2/16/25    HISTORY OF PRESENT ILLNESS     Chief Complaint   Patient presents with    Fall       History Provided By: Patient    HPI: Kaitlin Crespo is a 34 y.o. female presents to the ED complaining of pain in her right buttock area the right subscapular area right shoulder and arm she states that 2 days ago she slipped down while going down steps landing on her buttock she denies any nausea vomiting abdominal pain headache or loss of consciousness    PAST MEDICAL HISTORY   Past Medical History:  Past Medical History:   Diagnosis Date    Asthma     Bipolar 1 disorder (HCC)     Depression     Psychosis (HCC)     Schizophrenia (HCC)        Past Surgical History:  History reviewed. No pertinent surgical history.    Family History:  History reviewed. No pertinent family history.    Social History:  Social History     Tobacco Use    Smoking status: Every Day     Current packs/day: 0.25     Types: Cigarettes     Passive exposure: Never    Smokeless tobacco: Never   Vaping Use    Vaping status: Never Used   Substance Use Topics    Alcohol use: Not Currently    Drug use: Not Currently       Allergies:  Allergies   Allergen Reactions    Penicillins Hives    Peanut-Containing Drug Products Hives and Swelling       PCP: Benjamin Dimas Sr., MD    Current Meds:   No current facility-administered medications for this encounter.     Current Outpatient Medications   Medication Sig Dispense Refill    ARIPiprazole (ABILIFY) 5 MG tablet       sertraline (ZOLOFT) 50 MG tablet          Social Determinants of Health:   Social Determinants of Health     Tobacco Use: High Risk (2/16/2025)    Patient History     Smoking Tobacco Use: Every Day     Smokeless Tobacco Use: Never

## 2025-02-16 NOTE — ED TRIAGE NOTES
Pt fell yesterday down about 7 steps on to her right side and she is now having pain on the right side of her body. Pt rates pain 8/10 ATT.

## 2025-03-25 ENCOUNTER — HOSPITAL ENCOUNTER (EMERGENCY)
Facility: HOSPITAL | Age: 35
Discharge: HOME | End: 2025-03-25
Attending: EMERGENCY MEDICINE
Payer: MEDICARE

## 2025-03-25 VITALS
OXYGEN SATURATION: 100 % | HEART RATE: 83 BPM | BODY MASS INDEX: 25.71 KG/M2 | HEIGHT: 66 IN | RESPIRATION RATE: 16 BRPM | TEMPERATURE: 97.1 F | SYSTOLIC BLOOD PRESSURE: 143 MMHG | WEIGHT: 160 LBS | DIASTOLIC BLOOD PRESSURE: 96 MMHG

## 2025-03-25 DIAGNOSIS — F41.1 ANXIETY STATE: Primary | ICD-10-CM

## 2025-03-25 PROCEDURE — 99282 EMERGENCY DEPT VISIT SF MDM: CPT

## 2025-03-25 ASSESSMENT — LIFESTYLE VARIABLES
HOW OFTEN DO YOU HAVE A DRINK CONTAINING ALCOHOL: PATIENT DECLINED
HOW MANY STANDARD DRINKS CONTAINING ALCOHOL DO YOU HAVE ON A TYPICAL DAY: PATIENT DECLINED

## 2025-03-25 ASSESSMENT — PAIN SCALES - GENERAL: PAINLEVEL_OUTOF10: 0

## 2025-03-25 ASSESSMENT — PAIN - FUNCTIONAL ASSESSMENT: PAIN_FUNCTIONAL_ASSESSMENT: 0-10

## 2025-03-25 NOTE — ED NOTES
Pt given discharge and follow up instructions. Education on follow up care given. Advised to follow with [facility]. Pt shows understanding of same.
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
## 2025-03-25 NOTE — ED PROVIDER NOTES
[facility] EMERGENCY DEPARTMENT  EMERGENCY DEPARTMENT ENCOUNTER      Pt Name: Kaitlin Crespo  MRN: [id number]  Birthdate 1990  Date of evaluation: 3/25/2025  Provider: Walter Alicea MD    CHIEF COMPLAINT       Chief Complaint   Patient presents with    Mental Health Problem         HISTORY OF PRESENT ILLNESS   (Location/Symptom, Timing/Onset, Context/Setting, Quality, Duration, Modifying Factors, Severity)  Note limiting factors.   Kaitlin Crespo is a 34 y.o. female who presents to the emergency department with concern for anger issues at home with her roommates.  Apparently argument 2 days ago.  Underlying bipolar disorder and schizophrenia.  Patient denies suicidal ideation homicidal ideation thoughts of self-harm or harm to others she is having no auditory or visual hallucinations.  Previously managed with Abilify and hydralazine however the patient stopped Abilify over 3 months ago.  She was evaluated by [place] [id number] this morning and instructed to return in 5 days.  No physical complaints    HPI    Nursing Notes were reviewed.    REVIEW OF SYSTEMS    (2-9 systems for level 4, 10 or more for level 5)     Review of Systems   Psychiatric/Behavioral:  Positive for behavioral problems. Negative for agitation, confusion, decreased concentration, dysphoric mood, hallucinations, self-injury, sleep disturbance and suicidal ideas. The patient is nervous/anxious. The patient is not hyperactive.        Except as noted above the remainder of the review of systems was reviewed and negative.       PAST MEDICAL HISTORY     Past Medical History:   Diagnosis Date    Asthma     Bipolar 1 disorder (HCC)     Depression     Psychosis (HCC)     Schizophrenia (HCC)          SURGICAL HISTORY     History reviewed. No pertinent surgical history.      CURRENT MEDICATIONS       Previous Medications    ARIPIPRAZOLE (ABILIFY) 5 MG TABLET        SERTRALINE (ZOLOFT) 50 MG TABLET           ALLERGIES     Penicillins and

## 2025-03-26 ENCOUNTER — HOSPITAL ENCOUNTER (EMERGENCY)
Facility: HOSPITAL | Age: 35
Discharge: HOME | End: 2025-03-26
Attending: EMERGENCY MEDICINE
Payer: MEDICARE

## 2025-03-26 VITALS
BODY MASS INDEX: 25.71 KG/M2 | HEIGHT: 66 IN | WEIGHT: 160 LBS | DIASTOLIC BLOOD PRESSURE: 110 MMHG | HEART RATE: 89 BPM | TEMPERATURE: 97.8 F | RESPIRATION RATE: 17 BRPM | OXYGEN SATURATION: 100 % | SYSTOLIC BLOOD PRESSURE: 146 MMHG

## 2025-03-26 DIAGNOSIS — F31.9 BIPOLAR 1 DISORDER (HCC): ICD-10-CM

## 2025-03-26 DIAGNOSIS — F43.9 SITUATIONAL STRESS: ICD-10-CM

## 2025-03-26 DIAGNOSIS — F20.9 SCHIZOPHRENIA, UNSPECIFIED TYPE: Primary | ICD-10-CM

## 2025-03-26 PROCEDURE — 99282 EMERGENCY DEPT VISIT SF MDM: CPT

## 2025-03-26 ASSESSMENT — PAIN SCALES - GENERAL: PAINLEVEL_OUTOF10: 0

## 2025-03-26 ASSESSMENT — PAIN - FUNCTIONAL ASSESSMENT: PAIN_FUNCTIONAL_ASSESSMENT: 0-10

## 2025-03-26 NOTE — ED TRIAGE NOTES
Pt reports seen yesterday for increased agitation and depression, returns today for same. Reports she raffy most days than not and states she recently lost her job, family has temporary custody of her child and is on probation. PT requesting voluntary admission at this time. Reports she is not currently on medications

## 2025-04-03 ENCOUNTER — OFFICE VISIT (OUTPATIENT)
Age: 35
End: 2025-04-03

## 2025-04-03 VITALS
OXYGEN SATURATION: 99 % | DIASTOLIC BLOOD PRESSURE: 92 MMHG | RESPIRATION RATE: 18 BRPM | HEIGHT: 66 IN | WEIGHT: 163.7 LBS | BODY MASS INDEX: 26.31 KG/M2 | HEART RATE: 81 BPM | SYSTOLIC BLOOD PRESSURE: 139 MMHG | TEMPERATURE: 98.2 F

## 2025-04-03 DIAGNOSIS — F17.210 CIGARETTE NICOTINE DEPENDENCE WITHOUT COMPLICATION: ICD-10-CM

## 2025-04-03 DIAGNOSIS — F31.9 BIPOLAR 1 DISORDER (HCC): Primary | Chronic | ICD-10-CM

## 2025-04-03 RX ORDER — HYDROXYZINE HYDROCHLORIDE 25 MG/1
25 TABLET, FILM COATED ORAL DAILY PRN
COMMUNITY
End: 2025-04-03 | Stop reason: SDUPTHER

## 2025-04-03 RX ORDER — ARIPIPRAZOLE 5 MG/1
5 TABLET ORAL
Qty: 30 TABLET | Refills: 0 | Status: SHIPPED | OUTPATIENT
Start: 2025-04-03

## 2025-04-03 RX ORDER — HYDROXYZINE HYDROCHLORIDE 25 MG/1
25 TABLET, FILM COATED ORAL DAILY
Qty: 30 TABLET | Refills: 0 | Status: SHIPPED | OUTPATIENT
Start: 2025-04-03

## 2025-04-03 RX ORDER — NICOTINE 21 MG/24HR
1 PATCH, TRANSDERMAL 24 HOURS TRANSDERMAL DAILY
Qty: 42 PATCH | Refills: 0 | Status: SHIPPED | OUTPATIENT
Start: 2025-04-03 | End: 2025-05-15

## 2025-04-03 ASSESSMENT — PATIENT HEALTH QUESTIONNAIRE - PHQ9
5. POOR APPETITE OR OVEREATING: SEVERAL DAYS
6. FEELING BAD ABOUT YOURSELF - OR THAT YOU ARE A FAILURE OR HAVE LET YOURSELF OR YOUR FAMILY DOWN: NEARLY EVERY DAY
4. FEELING TIRED OR HAVING LITTLE ENERGY: NOT AT ALL
SUM OF ALL RESPONSES TO PHQ QUESTIONS 1-9: 7
9. THOUGHTS THAT YOU WOULD BE BETTER OFF DEAD, OR OF HURTING YOURSELF: NOT AT ALL
1. LITTLE INTEREST OR PLEASURE IN DOING THINGS: SEVERAL DAYS
7. TROUBLE CONCENTRATING ON THINGS, SUCH AS READING THE NEWSPAPER OR WATCHING TELEVISION: NOT AT ALL
3. TROUBLE FALLING OR STAYING ASLEEP: SEVERAL DAYS
SUM OF ALL RESPONSES TO PHQ QUESTIONS 1-9: 7
2. FEELING DOWN, DEPRESSED OR HOPELESS: SEVERAL DAYS
SUM OF ALL RESPONSES TO PHQ QUESTIONS 1-9: 7
SUM OF ALL RESPONSES TO PHQ QUESTIONS 1-9: 7
8. MOVING OR SPEAKING SO SLOWLY THAT OTHER PEOPLE COULD HAVE NOTICED. OR THE OPPOSITE, BEING SO FIGETY OR RESTLESS THAT YOU HAVE BEEN MOVING AROUND A LOT MORE THAN USUAL: NOT AT ALL

## 2025-04-03 ASSESSMENT — ANXIETY QUESTIONNAIRES
IF YOU CHECKED OFF ANY PROBLEMS ON THIS QUESTIONNAIRE, HOW DIFFICULT HAVE THESE PROBLEMS MADE IT FOR YOU TO DO YOUR WORK, TAKE CARE OF THINGS AT HOME, OR GET ALONG WITH OTHER PEOPLE: SOMEWHAT DIFFICULT
2. NOT BEING ABLE TO STOP OR CONTROL WORRYING: NOT AT ALL
4. TROUBLE RELAXING: NOT AT ALL
6. BECOMING EASILY ANNOYED OR IRRITABLE: NOT AT ALL
GAD7 TOTAL SCORE: 0
3. WORRYING TOO MUCH ABOUT DIFFERENT THINGS: NOT AT ALL
7. FEELING AFRAID AS IF SOMETHING AWFUL MIGHT HAPPEN: NOT AT ALL
1. FEELING NERVOUS, ANXIOUS, OR ON EDGE: NOT AT ALL
5. BEING SO RESTLESS THAT IT IS HARD TO SIT STILL: NOT AT ALL

## 2025-04-03 ASSESSMENT — ENCOUNTER SYMPTOMS
EYES NEGATIVE: 1
RESPIRATORY NEGATIVE: 1
NAUSEA: 1

## 2025-04-03 NOTE — PROGRESS NOTES
of the stressful experience (for example, people, places, conversations, activities, objects, or situations)? 4   Trouble remembering important parts of the stressful experience? 3   Having strong negative beliefs about yourself, other people, or the world (for example, having thoughts such as: I am bad, there is something seriously wrong with me, no one can be trusted, the world is completely dangerous)? 4   Blaming yourself or someone else for the stressful experience or what happened after it? 3   Having strong negative feelings such as fear, horror, anger, guilt, or shame? 3   Loss of interest in activities that you used to enjoy? 1   Feeling distant or cut off from other people? 1   Trouble experiencing positive feelings (for example, being unable to feel happiness or have loving feelings for people close to you)? 1   Irritable behavior, angry outbursts, or acting aggressively? 3   Taking too many risks or doing things that could cause you harm? 2   Being \"superalert\" or watchful or on guard? 1   Feeling jumpy or easily startled? 1   Having difficulty concentrating? 1   Trouble falling or staying asleep? 2   Post-Traumatic Stress Disorder Total Score 42                  AUDIT Screen Score:       3/25/2025     4:55 PM 2/16/2025    12:48 AM 9/17/2024    12:44 AM   [facility] AMB AUDIT C   Q1: How often do you have a drink containing alcohol? Pt Declined Never Never   Q2: How many drinks containing alcohol do you have on a typical day when you are drinking? Pt Declined None None   Q3: How often do you have six or more drinks on one occasion? Pt Declined Never Never   AUDIT-C Score -1 0 -1       DAST-10 Score:         No data to display                 Document Brief Intervention (corresponds directly with the 5 A's, Ask, Advise, Assess, Assist, and Arrange):  not applicable    PDMP Monitoring:    Last PDMP Diaz as Reviewed:  Review User Review Instant Review Result   CORA VACA 4/3/2025  9:37 AM Reviewed PDMP

## 2025-05-09 ENCOUNTER — HOSPITAL ENCOUNTER (EMERGENCY)
Facility: HOSPITAL | Age: 35
Discharge: HOME | End: 2025-05-09
Attending: EMERGENCY MEDICINE
Payer: MEDICARE

## 2025-05-09 VITALS
HEART RATE: 68 BPM | TEMPERATURE: 97.3 F | DIASTOLIC BLOOD PRESSURE: 94 MMHG | WEIGHT: 172.7 LBS | SYSTOLIC BLOOD PRESSURE: 140 MMHG | BODY MASS INDEX: 30.6 KG/M2 | OXYGEN SATURATION: 100 % | RESPIRATION RATE: 16 BRPM | HEIGHT: 63 IN

## 2025-05-09 DIAGNOSIS — R07.89 ATYPICAL CHEST PAIN: Primary | ICD-10-CM

## 2025-05-09 DIAGNOSIS — R00.2 PALPITATIONS: ICD-10-CM

## 2025-05-09 LAB
ALBUMIN SERPL-MCNC: 3.7 G/DL (ref 3.5–5)
ALBUMIN/GLOB SERPL: 1 (ref 1.1–2.2)
ALP SERPL-CCNC: 50 U/L (ref 45–117)
ALT SERPL-CCNC: 19 U/L (ref 12–78)
ANION GAP SERPL CALC-SCNC: 6 MMOL/L (ref 2–12)
AST SERPL W P-5'-P-CCNC: 11 U/L (ref 15–37)
BASOPHILS # BLD: 0.05 K/UL (ref 0–0.1)
BASOPHILS NFR BLD: 0.6 % (ref 0–1)
BILIRUB SERPL-MCNC: 0.3 MG/DL (ref 0.2–1)
BUN SERPL-MCNC: 10 MG/DL (ref 6–20)
BUN/CREAT SERPL: 10 (ref 12–20)
CA-I BLD-MCNC: 8.9 MG/DL (ref 8.5–10.1)
CHLORIDE SERPL-SCNC: 105 MMOL/L (ref 97–108)
CO2 SERPL-SCNC: 27 MMOL/L (ref 21–32)
CREAT SERPL-MCNC: 0.99 MG/DL (ref 0.55–1.02)
DIFFERENTIAL METHOD BLD: ABNORMAL
EKG ATRIAL RATE: 68 BPM
EKG DIAGNOSIS: NORMAL
EKG P AXIS: 61 DEGREES
EKG P-R INTERVAL: 159 MS
EKG Q-T INTERVAL: 343 MS
EKG QRS DURATION: 82 MS
EKG QTC CALCULATION (BAZETT): 368 MS
EKG R AXIS: 52 DEGREES
EKG T AXIS: 12 DEGREES
EKG VENTRICULAR RATE: 69 BPM
EOSINOPHIL # BLD: 0.07 K/UL (ref 0–0.4)
EOSINOPHIL NFR BLD: 0.8 % (ref 0–7)
ERYTHROCYTE [DISTWIDTH] IN BLOOD BY AUTOMATED COUNT: 15 % (ref 11.5–14.5)
GLOBULIN SER CALC-MCNC: 3.8 G/DL (ref 2–4)
GLUCOSE SERPL-MCNC: 91 MG/DL (ref 65–100)
HCT VFR BLD AUTO: 37.5 % (ref 35–47)
HGB BLD-MCNC: 12.3 G/DL (ref 11.5–16)
IMM GRANULOCYTES # BLD AUTO: 0.02 K/UL (ref 0–0.04)
IMM GRANULOCYTES NFR BLD AUTO: 0.2 % (ref 0–0.5)
LYMPHOCYTES # BLD: 2.48 K/UL (ref 0.8–3.5)
LYMPHOCYTES NFR BLD: 30 % (ref 12–49)
MAGNESIUM SERPL-MCNC: 1.8 MG/DL (ref 1.6–2.4)
MCH RBC QN AUTO: 29.6 PG (ref 26–34)
MCHC RBC AUTO-ENTMCNC: 32.8 G/DL (ref 30–36.5)
MCV RBC AUTO: 90.1 FL (ref 80–99)
MONOCYTES # BLD: 0.66 K/UL (ref 0–1)
MONOCYTES NFR BLD: 8 % (ref 5–13)
NEUTS SEG # BLD: 4.99 K/UL (ref 1.8–8)
NEUTS SEG NFR BLD: 60.4 % (ref 32–75)
NRBC # BLD: 0 K/UL (ref 0–0.01)
NRBC BLD-RTO: 0 PER 100 WBC
PLATELET # BLD AUTO: 280 K/UL (ref 150–400)
PMV BLD AUTO: 10.5 FL (ref 8.9–12.9)
POTASSIUM SERPL-SCNC: 4.4 MMOL/L (ref 3.5–5.1)
PROT SERPL-MCNC: 7.5 G/DL (ref 6.4–8.2)
RBC # BLD AUTO: 4.16 M/UL (ref 3.8–5.2)
SODIUM SERPL-SCNC: 138 MMOL/L (ref 136–145)
WBC # BLD AUTO: 8.3 K/UL (ref 3.6–11)

## 2025-05-09 PROCEDURE — 83735 ASSAY OF MAGNESIUM: CPT

## 2025-05-09 PROCEDURE — 80053 COMPREHEN METABOLIC PANEL: CPT

## 2025-05-09 PROCEDURE — 99284 EMERGENCY DEPT VISIT MOD MDM: CPT

## 2025-05-09 PROCEDURE — 36415 COLL VENOUS BLD VENIPUNCTURE: CPT

## 2025-05-09 PROCEDURE — 85025 COMPLETE CBC W/AUTO DIFF WBC: CPT

## 2025-05-09 PROCEDURE — 93005 ELECTROCARDIOGRAM TRACING: CPT | Performed by: EMERGENCY MEDICINE

## 2025-05-09 ASSESSMENT — PAIN SCALES - GENERAL: PAINLEVEL_OUTOF10: 0

## 2025-05-09 ASSESSMENT — PAIN - FUNCTIONAL ASSESSMENT
PAIN_FUNCTIONAL_ASSESSMENT: ACTIVITIES ARE NOT PREVENTED
PAIN_FUNCTIONAL_ASSESSMENT: 0-10

## 2025-05-09 NOTE — ED PROVIDER NOTES
[facility] EMERGENCY DEPT  EMERGENCY DEPARTMENT HISTORY AND PHYSICAL EXAM      Date: 5/9/2025  Patient Name: Kaitlin Crespo  MRN: [id number]  YOB: 1990  Date of evaluation: 5/9/2025  Provider: Hayley Weiss MD   Note Started: 8:05 AM EDT 5/9/25    HISTORY OF PRESENT ILLNESS     Chief Complaint   Patient presents with    Chest Pain       History Provided By: Patient    HPI: Kaitlin Crespo is a 34 y.o. female     PAST MEDICAL HISTORY   Past Medical History:  Past Medical History:   Diagnosis Date    Asthma     Bipolar 1 disorder (HCC)     Depression     Psychosis (HCC)     Schizophrenia (HCC)        Past Surgical History:  History reviewed. No pertinent surgical history.    Family History:  Family History   Problem Relation Age of Onset    No Known Problems Mother     No Known Problems Father     No Known Problems Sister     No Known Problems Sister     No Known Problems Brother     No Known Problems Brother        Social History:  Social History     Tobacco Use    Smoking status: Every Day     Current packs/day: 0.50     Types: Cigarettes     Passive exposure: Never    Smokeless tobacco: Never   Vaping Use    Vaping status: Never Used   Substance Use Topics    Alcohol use: Yes     Comment: liquor, beer, until drunk, socially    Drug use: Not Currently       Allergies:  Allergies   Allergen Reactions    Penicillins Hives    Peanut-Containing Drug Products Hives and Swelling       PCP: Benjamin Dimas Sr., MD    Current Meds:   No current facility-administered medications for this encounter.     Current Outpatient Medications   Medication Sig Dispense Refill    ARIPiprazole (ABILIFY) 5 MG tablet Take 1 tablet by mouth nightly 30 tablet 0    hydrOXYzine HCl (ATARAX) 25 MG tablet Take 1 tablet by mouth daily 30 tablet 0    nicotine (NICODERM CQ) 14 MG/24HR Place 1 patch onto the skin daily 42 patch 0       Social Determinants of Health:   Social Drivers of Health     Tobacco Use: High Risk
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
## 2025-05-13 ASSESSMENT — HEART SCORE: ECG: NORMAL

## 2025-06-04 ENCOUNTER — OFFICE VISIT (OUTPATIENT)
Age: 35
End: 2025-06-04
Payer: MEDICARE

## 2025-06-04 VITALS
RESPIRATION RATE: 20 BRPM | OXYGEN SATURATION: 98 % | HEART RATE: 97 BPM | TEMPERATURE: 97.4 F | WEIGHT: 169.06 LBS | HEIGHT: 63 IN | BODY MASS INDEX: 29.95 KG/M2 | SYSTOLIC BLOOD PRESSURE: 128 MMHG | DIASTOLIC BLOOD PRESSURE: 83 MMHG

## 2025-06-04 DIAGNOSIS — F31.9 BIPOLAR 1 DISORDER (HCC): Primary | Chronic | ICD-10-CM

## 2025-06-04 DIAGNOSIS — R45.4 IRRITABILITY AND ANGER: Chronic | ICD-10-CM

## 2025-06-04 PROCEDURE — 99214 OFFICE O/P EST MOD 30 MIN: CPT

## 2025-06-04 RX ORDER — HYDROXYZINE HYDROCHLORIDE 25 MG/1
25 TABLET, FILM COATED ORAL EVERY 12 HOURS PRN
Qty: 30 TABLET | Refills: 0 | Status: SHIPPED | OUTPATIENT
Start: 2025-06-04

## 2025-06-04 RX ORDER — ARIPIPRAZOLE 10 MG/1
10 TABLET ORAL
Qty: 30 TABLET | Refills: 0 | Status: SHIPPED | OUTPATIENT
Start: 2025-06-04

## 2025-06-04 ASSESSMENT — PATIENT HEALTH QUESTIONNAIRE - PHQ9
SUM OF ALL RESPONSES TO PHQ QUESTIONS 1-9: 0
6. FEELING BAD ABOUT YOURSELF - OR THAT YOU ARE A FAILURE OR HAVE LET YOURSELF OR YOUR FAMILY DOWN: SEVERAL DAYS
SUM OF ALL RESPONSES TO PHQ QUESTIONS 1-9: 3
9. THOUGHTS THAT YOU WOULD BE BETTER OFF DEAD, OR OF HURTING YOURSELF: NOT AT ALL
SUM OF ALL RESPONSES TO PHQ QUESTIONS 1-9: 3
2. FEELING DOWN, DEPRESSED OR HOPELESS: SEVERAL DAYS
1. LITTLE INTEREST OR PLEASURE IN DOING THINGS: NOT AT ALL
SUM OF ALL RESPONSES TO PHQ QUESTIONS 1-9: 3
SUM OF ALL RESPONSES TO PHQ QUESTIONS 1-9: 3
2. FEELING DOWN, DEPRESSED OR HOPELESS: NOT AT ALL
SUM OF ALL RESPONSES TO PHQ QUESTIONS 1-9: 0
4. FEELING TIRED OR HAVING LITTLE ENERGY: NOT AT ALL
7. TROUBLE CONCENTRATING ON THINGS, SUCH AS READING THE NEWSPAPER OR WATCHING TELEVISION: NOT AT ALL
8. MOVING OR SPEAKING SO SLOWLY THAT OTHER PEOPLE COULD HAVE NOTICED. OR THE OPPOSITE, BEING SO FIGETY OR RESTLESS THAT YOU HAVE BEEN MOVING AROUND A LOT MORE THAN USUAL: NOT AT ALL
10. IF YOU CHECKED OFF ANY PROBLEMS, HOW DIFFICULT HAVE THESE PROBLEMS MADE IT FOR YOU TO DO YOUR WORK, TAKE CARE OF THINGS AT HOME, OR GET ALONG WITH OTHER PEOPLE: SOMEWHAT DIFFICULT
SUM OF ALL RESPONSES TO PHQ QUESTIONS 1-9: 0
3. TROUBLE FALLING OR STAYING ASLEEP: SEVERAL DAYS
5. POOR APPETITE OR OVEREATING: NOT AT ALL
1. LITTLE INTEREST OR PLEASURE IN DOING THINGS: NOT AT ALL
SUM OF ALL RESPONSES TO PHQ QUESTIONS 1-9: 0

## 2025-06-04 ASSESSMENT — ENCOUNTER SYMPTOMS
EYES NEGATIVE: 1
RESPIRATORY NEGATIVE: 1
GASTROINTESTINAL NEGATIVE: 1
ALLERGIC/IMMUNOLOGIC NEGATIVE: 1

## 2025-06-04 NOTE — PROGRESS NOTES
Chief Complaint   Patient presents with    Follow-up    Bipolar     \"Have you been to the ER, urgent care clinic since your last visit?  Hospitalized since your last visit?\"    YES - When: approximately 1 months ago.  Where and Why: Chest pain, [facility]..    “Have you seen or consulted any other health care providers outside our system since your last visit?”    NO    /83 (BP Site: Right Upper Arm, Patient Position: Sitting, BP Cuff Size: Medium Adult)   Pulse 97   Temp 97.4 °F (36.3 °C) (Oral)   Resp 20   Ht 1.6 m (5' 3\")   Wt 76.7 kg (169 lb 1 oz)   LMP 04/21/2025 (Exact Date)   SpO2 98%   BMI 29.95 kg/m²            
Incoming call from Clair. She states the bleeding has stopped and is only with BM now. She states patient is holding Eliquis for an upcoming procedure. She is wondering if she should still have REUA today. Spoke with Clair in length regarding this. She states patient is really weak and she is questioning how much blood she lost.  Will order CBC for pre-op today. Clair and patient feel comfortable having REUA to possibly find source of bleeding.   
(HCC)     Depression     Psychosis (HCC)     Schizophrenia (HCC)         Family History   Problem Relation Age of Onset    No Known Problems Mother     No Known Problems Father     No Known Problems Sister     No Known Problems Sister     No Known Problems Brother     No Known Problems Brother           Social History     Socioeconomic History    Marital status: Single     Spouse name: Not on file    Number of children: Not on file    Years of education: Not on file    Highest education level: Not on file   Occupational History    Not on file   Tobacco Use    Smoking status: Every Day     Current packs/day: 0.50     Types: Cigarettes     Passive exposure: Never    Smokeless tobacco: Never   Vaping Use    Vaping status: Never Used   Substance and Sexual Activity    Alcohol use: Yes     Comment: liquor, beer, until drunk, socially    Drug use: Not Currently    Sexual activity: Not Currently     Partners: Male   Other Topics Concern    Not on file   Social History Narrative    Not on file     Social Drivers of Health     Financial Resource Strain: Not on file   Food Insecurity: No Food Insecurity (3/30/2024)    Hunger Vital Sign     Worried About Running Out of Food in the Last Year: Never true     Ran Out of Food in the Last Year: Never true   Transportation Needs: No Transportation Needs (3/30/2024)    PRAPARE - Transportation     Lack of Transportation (Medical): No     Lack of Transportation (Non-Medical): No   Physical Activity: Not on file   Stress: Not on file   Social Connections: Not on file   Intimate Partner Violence: Not on file   Housing Stability: High Risk (3/30/2024)    Housing Stability Vital Sign     Unable to Pay for Housing in the Last Year: No     Number of Places Lived in the Last Year: 1     Unstable Housing in the Last Year: Yes        PSYCHIATRIC ROS:  Depression: Pt rates at 4, on a scale of 0-10 (10 being extreme impairment). depressed mood, insomnia, feelings of worthlessness/guilt,

## 2025-07-04 ENCOUNTER — HOSPITAL ENCOUNTER (EMERGENCY)
Facility: HOSPITAL | Age: 35
Discharge: HOME | End: 2025-07-04
Attending: EMERGENCY MEDICINE
Payer: MEDICARE

## 2025-07-04 VITALS
SYSTOLIC BLOOD PRESSURE: 118 MMHG | RESPIRATION RATE: 16 BRPM | OXYGEN SATURATION: 100 % | HEART RATE: 83 BPM | TEMPERATURE: 97.9 F | DIASTOLIC BLOOD PRESSURE: 74 MMHG

## 2025-07-04 DIAGNOSIS — N94.6 MENSTRUAL CRAMP: Primary | ICD-10-CM

## 2025-07-04 LAB
APPEARANCE UR: CLEAR
BACTERIA URNS QL MICRO: NEGATIVE /HPF
BILIRUB UR QL: NEGATIVE
COLOR UR: ABNORMAL
GLUCOSE UR STRIP.AUTO-MCNC: NEGATIVE MG/DL
HCG UR QL: NEGATIVE
HGB UR QL STRIP: ABNORMAL
KETONES UR QL STRIP.AUTO: NEGATIVE MG/DL
LEUKOCYTE ESTERASE UR QL STRIP.AUTO: NEGATIVE
NITRITE UR QL STRIP.AUTO: NEGATIVE
PH UR STRIP: 6 (ref 5–8)
PROT UR STRIP-MCNC: NEGATIVE MG/DL
RBC #/AREA URNS HPF: ABNORMAL /HPF (ref 0–3)
SP GR UR REFRACTOMETRY: 1.02 (ref 1–1.03)
UROBILINOGEN UR QL STRIP.AUTO: 2 EU/DL (ref 0.2–1)
WBC URNS QL MICRO: ABNORMAL /HPF (ref 0–5)

## 2025-07-04 PROCEDURE — 99283 EMERGENCY DEPT VISIT LOW MDM: CPT

## 2025-07-04 PROCEDURE — 81001 URINALYSIS AUTO W/SCOPE: CPT

## 2025-07-04 PROCEDURE — 81025 URINE PREGNANCY TEST: CPT

## 2025-07-04 ASSESSMENT — ENCOUNTER SYMPTOMS
DIARRHEA: 0
NAUSEA: 0
VOMITING: 0
RESPIRATORY NEGATIVE: 1
ABDOMINAL PAIN: 1

## 2025-07-04 ASSESSMENT — PAIN - FUNCTIONAL ASSESSMENT: PAIN_FUNCTIONAL_ASSESSMENT: 0-10

## 2025-07-04 ASSESSMENT — PAIN DESCRIPTION - DESCRIPTORS: DESCRIPTORS: CRAMPING

## 2025-07-04 ASSESSMENT — PAIN DESCRIPTION - LOCATION: LOCATION: ABDOMEN

## 2025-07-04 ASSESSMENT — PAIN SCALES - GENERAL: PAINLEVEL_OUTOF10: 10

## 2025-07-04 ASSESSMENT — PAIN DESCRIPTION - ORIENTATION: ORIENTATION: RIGHT

## 2025-07-05 NOTE — ED NOTES
Patient is requesting to leave due to she has a cab ride coming to pick her up. Doctor was notified.

## 2025-07-05 NOTE — ED NOTES
[facility] EMERGENCY DEPT  EMERGENCY DEPARTMENT HISTORY AND PHYSICAL EXAM      Date of evaluation: 7/4/2025  Patient Name: Kaitlin Crespo  Birthdate 1990  MRN: [id number]  ED Provider: Pk Ortiz MD   Note Started: 8:45 PM EDT 7/4/25    HISTORY OF PRESENT ILLNESS     Chief Complaint   Patient presents with    Abdominal Pain       History Provided By: Patient     HPI: Kaitlin Crespo is a 35 y.o. female presenting with complaint of right sided abdominal pain that started yesterday.  She says she thinks she is starting her period.  She is on Depo and not sexually active.  At present, she has no pain.      PAST MEDICAL HISTORY   Past Medical History:  Past Medical History:   Diagnosis Date    Asthma     Bipolar 1 disorder (HCC)     Depression     Psychosis (HCC)     Schizophrenia (HCC)        Past Surgical History:  History reviewed. No pertinent surgical history.    Family History:  Family History   Problem Relation Age of Onset    No Known Problems Mother     No Known Problems Father     No Known Problems Sister     No Known Problems Sister     No Known Problems Brother     No Known Problems Brother        Social History:  Social History     Tobacco Use    Smoking status: Every Day     Current packs/day: 0.50     Types: Cigarettes     Passive exposure: Never    Smokeless tobacco: Never   Vaping Use    Vaping status: Never Used   Substance Use Topics    Alcohol use: Yes     Comment: liquor, beer, until drunk, socially    Drug use: Not Currently       Allergies:  Allergies   Allergen Reactions    Penicillins Hives    Peanut-Containing Drug Products Hives and Swelling       PCP: Benjamin Dimas Sr., MD    Current Meds:   No current facility-administered medications for this encounter.     Current Outpatient Medications   Medication Sig Dispense Refill    hydrOXYzine HCl (ATARAX) 25 MG tablet Take 1 tablet by mouth every 12 hours as needed for Anxiety 30 tablet 0    ARIPiprazole (ABILIFY) 10 MG tablet

## 2025-07-05 NOTE — ED NOTES
Patient was given an AMA form and stated she will stay until her cab ride comes and let her know if she can stay or if she needs to leave due to the cab ride not able to come back and pick her up.

## 2025-07-05 NOTE — ED TRIAGE NOTES
Patient presents with abdominal cramping on right side.  Patient denies any injury and states she was sitting on floor when it started happening.  Patient required assistance by wheelchair to get to room.  Pain 10/10 and no meds taken PTA.